# Patient Record
Sex: MALE | Race: WHITE | NOT HISPANIC OR LATINO | Employment: FULL TIME | URBAN - METROPOLITAN AREA
[De-identification: names, ages, dates, MRNs, and addresses within clinical notes are randomized per-mention and may not be internally consistent; named-entity substitution may affect disease eponyms.]

---

## 2018-02-07 ENCOUNTER — OFFICE VISIT CONVERTED (OUTPATIENT)
Dept: FAMILY MEDICINE CLINIC | Facility: CLINIC | Age: 58
End: 2018-02-07
Attending: FAMILY MEDICINE

## 2018-02-07 ENCOUNTER — CONVERSION ENCOUNTER (OUTPATIENT)
Dept: FAMILY MEDICINE CLINIC | Facility: CLINIC | Age: 58
End: 2018-02-07

## 2018-04-12 ENCOUNTER — OFFICE VISIT CONVERTED (OUTPATIENT)
Dept: FAMILY MEDICINE CLINIC | Facility: CLINIC | Age: 58
End: 2018-04-12
Attending: FAMILY MEDICINE

## 2018-04-12 ENCOUNTER — CONVERSION ENCOUNTER (OUTPATIENT)
Dept: FAMILY MEDICINE CLINIC | Facility: CLINIC | Age: 58
End: 2018-04-12

## 2018-07-10 ENCOUNTER — CONVERSION ENCOUNTER (OUTPATIENT)
Dept: FAMILY MEDICINE CLINIC | Facility: CLINIC | Age: 58
End: 2018-07-10

## 2018-07-10 ENCOUNTER — OFFICE VISIT CONVERTED (OUTPATIENT)
Dept: FAMILY MEDICINE CLINIC | Facility: CLINIC | Age: 58
End: 2018-07-10
Attending: FAMILY MEDICINE

## 2018-10-30 ENCOUNTER — CONVERSION ENCOUNTER (OUTPATIENT)
Dept: FAMILY MEDICINE CLINIC | Facility: CLINIC | Age: 58
End: 2018-10-30

## 2018-10-30 ENCOUNTER — OFFICE VISIT CONVERTED (OUTPATIENT)
Dept: FAMILY MEDICINE CLINIC | Facility: CLINIC | Age: 58
End: 2018-10-30
Attending: FAMILY MEDICINE

## 2019-02-20 ENCOUNTER — OFFICE VISIT CONVERTED (OUTPATIENT)
Dept: FAMILY MEDICINE CLINIC | Facility: CLINIC | Age: 59
End: 2019-02-20
Attending: FAMILY MEDICINE

## 2019-02-21 ENCOUNTER — CONVERSION ENCOUNTER (OUTPATIENT)
Dept: FAMILY MEDICINE CLINIC | Facility: CLINIC | Age: 59
End: 2019-02-21

## 2019-05-29 ENCOUNTER — HOSPITAL ENCOUNTER (OUTPATIENT)
Dept: OTHER | Facility: HOSPITAL | Age: 59
Discharge: HOME | End: 2019-05-29

## 2019-05-29 LAB
ALBUMIN SERPL-MCNC: 4.2 G/DL (ref 3.5–5)
ALBUMIN/GLOB SERPL: 1.4 {RATIO} (ref 1.4–2.6)
ALP SERPL-CCNC: 65 U/L (ref 56–155)
ALT SERPL-CCNC: 47 U/L (ref 10–40)
ANION GAP SERPL CALC-SCNC: 19 MMOL/L (ref 8–19)
AST SERPL-CCNC: 36 U/L (ref 15–50)
BASOPHILS # BLD AUTO: 0.09 10*3/UL (ref 0–0.2)
BASOPHILS NFR BLD AUTO: 1 % (ref 0–3)
BILIRUB SERPL-MCNC: 1.01 MG/DL (ref 0.2–1.3)
BUN SERPL-MCNC: 12 MG/DL (ref 5–25)
BUN/CREAT SERPL: 11 {RATIO} (ref 6–20)
CALCIUM SERPL-MCNC: 9.5 MG/DL (ref 8.7–10.4)
CHLORIDE SERPL-SCNC: 99 MMOL/L (ref 99–111)
CHOLEST SERPL-MCNC: 163 MG/DL (ref 107–200)
CHOLEST/HDLC SERPL: 5.8 {RATIO} (ref 3–6)
CONV ABS IMM GRAN: 0.05 10*3/UL (ref 0–0.2)
CONV CO2: 28 MMOL/L (ref 22–32)
CONV IMMATURE GRAN: 0.6 % (ref 0–1.8)
CONV TOTAL PROTEIN: 7.3 G/DL (ref 6.3–8.2)
CREAT UR-MCNC: 1.11 MG/DL (ref 0.7–1.2)
DEPRECATED RDW RBC AUTO: 44.9 FL (ref 35.1–43.9)
EOSINOPHIL # BLD AUTO: 0.21 10*3/UL (ref 0–0.7)
EOSINOPHIL # BLD AUTO: 2.4 % (ref 0–7)
ERYTHROCYTE [DISTWIDTH] IN BLOOD BY AUTOMATED COUNT: 13.4 % (ref 11.6–14.4)
EST. AVERAGE GLUCOSE BLD GHB EST-MCNC: 194 MG/DL
GFR SERPLBLD BASED ON 1.73 SQ M-ARVRAT: >60 ML/MIN/{1.73_M2}
GLOBULIN UR ELPH-MCNC: 3.1 G/DL (ref 2–3.5)
GLUCOSE SERPL-MCNC: 181 MG/DL (ref 70–99)
HBA1C MFR BLD: 15.3 G/DL (ref 14–18)
HBA1C MFR BLD: 8.4 % (ref 3.5–5.7)
HCT VFR BLD AUTO: 45.3 % (ref 42–52)
HDLC SERPL-MCNC: 28 MG/DL (ref 40–60)
LDLC SERPL CALC-MCNC: 85 MG/DL (ref 70–100)
LYMPHOCYTES # BLD AUTO: 2.52 10*3/UL (ref 1–5)
MCH RBC QN AUTO: 30.6 PG (ref 27–31)
MCHC RBC AUTO-ENTMCNC: 33.8 G/DL (ref 33–37)
MCV RBC AUTO: 90.6 FL (ref 80–96)
MONOCYTES # BLD AUTO: 0.6 10*3/UL (ref 0.2–1.2)
MONOCYTES NFR BLD AUTO: 7 % (ref 3–10)
NEUTROPHILS # BLD AUTO: 5.12 10*3/UL (ref 2–8)
NEUTROPHILS NFR BLD AUTO: 59.7 % (ref 30–85)
NRBC CBCN: 0 % (ref 0–0.7)
OSMOLALITY SERPL CALC.SUM OF ELEC: 298 MOSM/KG (ref 273–304)
PLATELET # BLD AUTO: 278 10*3/UL (ref 130–400)
PMV BLD AUTO: 10 FL (ref 9.4–12.4)
POTASSIUM SERPL-SCNC: 4.4 MMOL/L (ref 3.5–5.3)
RBC # BLD AUTO: 5 10*6/UL (ref 4.7–6.1)
SODIUM SERPL-SCNC: 142 MMOL/L (ref 135–147)
TRIGL SERPL-MCNC: 251 MG/DL (ref 40–150)
TSH SERPL-ACNC: 5.24 M[IU]/L (ref 0.27–4.2)
VARIANT LYMPHS NFR BLD MANUAL: 29.3 % (ref 20–45)
VLDLC SERPL-MCNC: 50 MG/DL (ref 5–37)
WBC # BLD AUTO: 8.59 10*3/UL (ref 4.8–10.8)

## 2019-06-05 ENCOUNTER — OFFICE VISIT CONVERTED (OUTPATIENT)
Dept: FAMILY MEDICINE CLINIC | Facility: CLINIC | Age: 59
End: 2019-06-05
Attending: FAMILY MEDICINE

## 2019-06-05 ENCOUNTER — CONVERSION ENCOUNTER (OUTPATIENT)
Dept: FAMILY MEDICINE CLINIC | Facility: CLINIC | Age: 59
End: 2019-06-05

## 2019-07-12 ENCOUNTER — HOSPITAL ENCOUNTER (OUTPATIENT)
Dept: OTHER | Facility: HOSPITAL | Age: 59
Discharge: HOME | End: 2019-07-12
Attending: FAMILY MEDICINE

## 2019-07-12 LAB — PSA SERPL-MCNC: 2.21 NG/ML (ref 0–4)

## 2019-07-18 ENCOUNTER — CONVERSION ENCOUNTER (OUTPATIENT)
Dept: SURGERY | Facility: CLINIC | Age: 59
End: 2019-07-18

## 2019-07-18 ENCOUNTER — OFFICE VISIT CONVERTED (OUTPATIENT)
Dept: SURGERY | Facility: CLINIC | Age: 59
End: 2019-07-18
Attending: NURSE PRACTITIONER

## 2020-03-11 ENCOUNTER — HOSPITAL ENCOUNTER (OUTPATIENT)
Dept: LAB | Facility: HOSPITAL | Age: 60
Discharge: HOME | End: 2020-03-11
Attending: FAMILY MEDICINE

## 2020-03-11 LAB
EST. AVERAGE GLUCOSE BLD GHB EST-MCNC: 134 MG/DL
HBA1C MFR BLD: 6.3 % (ref 3.5–5.7)

## 2020-03-16 ENCOUNTER — OFFICE VISIT CONVERTED (OUTPATIENT)
Dept: FAMILY MEDICINE CLINIC | Facility: CLINIC | Age: 60
End: 2020-03-16
Attending: FAMILY MEDICINE

## 2020-05-29 ENCOUNTER — HOSPITAL ENCOUNTER (OUTPATIENT)
Dept: OTHER | Facility: HOSPITAL | Age: 60
Discharge: HOME | End: 2020-05-29

## 2020-05-29 LAB
ALBUMIN SERPL-MCNC: 4.4 G/DL (ref 3.5–5)
ALBUMIN/GLOB SERPL: 1.3 {RATIO} (ref 1.4–2.6)
ALP SERPL-CCNC: 60 U/L (ref 56–155)
ALT SERPL-CCNC: 29 U/L (ref 10–40)
ANION GAP SERPL CALC-SCNC: 17 MMOL/L (ref 8–19)
AST SERPL-CCNC: 19 U/L (ref 15–50)
BASOPHILS # BLD AUTO: 0.07 10*3/UL (ref 0–0.2)
BASOPHILS NFR BLD AUTO: 0.9 % (ref 0–3)
BILIRUB SERPL-MCNC: 1.27 MG/DL (ref 0.2–1.3)
BUN SERPL-MCNC: 16 MG/DL (ref 5–25)
BUN/CREAT SERPL: 13 {RATIO} (ref 6–20)
CALCIUM SERPL-MCNC: 9.5 MG/DL (ref 8.7–10.4)
CHLORIDE SERPL-SCNC: 102 MMOL/L (ref 99–111)
CHOLEST SERPL-MCNC: 150 MG/DL (ref 107–200)
CHOLEST/HDLC SERPL: 4.4 {RATIO} (ref 3–6)
CONV ABS IMM GRAN: 0.04 10*3/UL (ref 0–0.2)
CONV CO2: 25 MMOL/L (ref 22–32)
CONV IMMATURE GRAN: 0.5 % (ref 0–1.8)
CONV TOTAL PROTEIN: 7.7 G/DL (ref 6.3–8.2)
CREAT UR-MCNC: 1.19 MG/DL (ref 0.7–1.2)
DEPRECATED RDW RBC AUTO: 44 FL (ref 35.1–43.9)
EOSINOPHIL # BLD AUTO: 0.16 10*3/UL (ref 0–0.7)
EOSINOPHIL # BLD AUTO: 2 % (ref 0–7)
ERYTHROCYTE [DISTWIDTH] IN BLOOD BY AUTOMATED COUNT: 13.5 % (ref 11.6–14.4)
EST. AVERAGE GLUCOSE BLD GHB EST-MCNC: 160 MG/DL
GFR SERPLBLD BASED ON 1.73 SQ M-ARVRAT: >60 ML/MIN/{1.73_M2}
GLOBULIN UR ELPH-MCNC: 3.3 G/DL (ref 2–3.5)
GLUCOSE SERPL-MCNC: 121 MG/DL (ref 70–99)
HBA1C MFR BLD: 7.2 % (ref 3.5–5.7)
HCT VFR BLD AUTO: 48.7 % (ref 42–52)
HDLC SERPL-MCNC: 34 MG/DL (ref 40–60)
HGB BLD-MCNC: 16.5 G/DL (ref 14–18)
LDLC SERPL CALC-MCNC: 91 MG/DL (ref 70–100)
LYMPHOCYTES # BLD AUTO: 2.22 10*3/UL (ref 1–5)
LYMPHOCYTES NFR BLD AUTO: 28 % (ref 20–45)
MCH RBC QN AUTO: 30.2 PG (ref 27–31)
MCHC RBC AUTO-ENTMCNC: 33.9 G/DL (ref 33–37)
MCV RBC AUTO: 89 FL (ref 80–96)
MONOCYTES # BLD AUTO: 0.66 10*3/UL (ref 0.2–1.2)
MONOCYTES NFR BLD AUTO: 8.3 % (ref 3–10)
NEUTROPHILS # BLD AUTO: 4.79 10*3/UL (ref 2–8)
NEUTROPHILS NFR BLD AUTO: 60.3 % (ref 30–85)
NRBC CBCN: 0 % (ref 0–0.7)
OSMOLALITY SERPL CALC.SUM OF ELEC: 292 MOSM/KG (ref 273–304)
PLATELET # BLD AUTO: 287 10*3/UL (ref 130–400)
PMV BLD AUTO: 9.9 FL (ref 9.4–12.4)
POTASSIUM SERPL-SCNC: 4.1 MMOL/L (ref 3.5–5.3)
RBC # BLD AUTO: 5.47 10*6/UL (ref 4.7–6.1)
SODIUM SERPL-SCNC: 140 MMOL/L (ref 135–147)
TRIGL SERPL-MCNC: 124 MG/DL (ref 40–150)
TSH SERPL-ACNC: 3.71 M[IU]/L (ref 0.27–4.2)
VLDLC SERPL-MCNC: 25 MG/DL (ref 5–37)
WBC # BLD AUTO: 7.94 10*3/UL (ref 4.8–10.8)

## 2020-07-15 ENCOUNTER — OFFICE VISIT CONVERTED (OUTPATIENT)
Dept: FAMILY MEDICINE CLINIC | Facility: CLINIC | Age: 60
End: 2020-07-15
Attending: FAMILY MEDICINE

## 2020-07-15 ENCOUNTER — HOSPITAL ENCOUNTER (OUTPATIENT)
Dept: FAMILY MEDICINE CLINIC | Facility: CLINIC | Age: 60
Discharge: HOME | End: 2020-07-15
Attending: FAMILY MEDICINE

## 2020-07-16 ENCOUNTER — CONVERSION ENCOUNTER (OUTPATIENT)
Dept: FAMILY MEDICINE CLINIC | Facility: CLINIC | Age: 60
End: 2020-07-16

## 2020-09-09 ENCOUNTER — CONVERSION ENCOUNTER (OUTPATIENT)
Dept: GASTROENTEROLOGY | Facility: CLINIC | Age: 60
End: 2020-09-09
Attending: INTERNAL MEDICINE

## 2020-11-02 ENCOUNTER — HOSPITAL ENCOUNTER (OUTPATIENT)
Dept: PREADMISSION TESTING | Facility: HOSPITAL | Age: 60
Discharge: HOME | End: 2020-11-02
Attending: INTERNAL MEDICINE

## 2020-11-04 LAB — SARS-COV-2 RNA SPEC QL NAA+PROBE: NOT DETECTED

## 2020-11-06 ENCOUNTER — HOSPITAL ENCOUNTER (OUTPATIENT)
Dept: GASTROENTEROLOGY | Facility: HOSPITAL | Age: 60
Setting detail: HOSPITAL OUTPATIENT SURGERY
Discharge: HOME | End: 2020-11-06
Attending: INTERNAL MEDICINE

## 2020-11-06 LAB — GLUCOSE BLD-MCNC: 102 MG/DL (ref 70–99)

## 2020-12-28 ENCOUNTER — HOSPITAL ENCOUNTER (OUTPATIENT)
Dept: OTHER | Facility: HOSPITAL | Age: 60
Discharge: HOME | End: 2020-12-28
Attending: INTERNAL MEDICINE

## 2021-01-21 ENCOUNTER — HOSPITAL ENCOUNTER (OUTPATIENT)
Dept: OTHER | Facility: HOSPITAL | Age: 61
Discharge: HOME | End: 2021-01-21
Attending: INTERNAL MEDICINE

## 2021-05-04 ENCOUNTER — HOSPITAL ENCOUNTER (OUTPATIENT)
Dept: OTHER | Facility: HOSPITAL | Age: 61
Discharge: HOME | End: 2021-05-04
Attending: FAMILY MEDICINE

## 2021-05-04 LAB
EST. AVERAGE GLUCOSE BLD GHB EST-MCNC: 126 MG/DL
HBA1C MFR BLD: 6 % (ref 3.5–5.7)
PSA SERPL-MCNC: 1.07 NG/ML (ref 0–4)

## 2021-05-05 ENCOUNTER — HOSPITAL ENCOUNTER (OUTPATIENT)
Dept: OTHER | Facility: HOSPITAL | Age: 61
Discharge: HOME | End: 2021-05-05
Attending: FAMILY MEDICINE

## 2021-05-05 ENCOUNTER — HOSPITAL ENCOUNTER (OUTPATIENT)
Dept: OTHER | Facility: HOSPITAL | Age: 61
Discharge: HOME | End: 2021-05-05

## 2021-05-05 LAB
ALBUMIN SERPL-MCNC: 4.5 G/DL (ref 3.5–5)
ALBUMIN/GLOB SERPL: 1.5 {RATIO} (ref 1.4–2.6)
ALP SERPL-CCNC: 60 U/L (ref 56–155)
ALT SERPL-CCNC: 26 U/L (ref 10–40)
ANION GAP SERPL CALC-SCNC: 17 MMOL/L (ref 8–19)
AST SERPL-CCNC: 27 U/L (ref 15–50)
BASOPHILS # BLD AUTO: 0.1 10*3/UL (ref 0–0.2)
BASOPHILS NFR BLD AUTO: 1.1 % (ref 0–3)
BILIRUB SERPL-MCNC: 1.14 MG/DL (ref 0.2–1.3)
BUN SERPL-MCNC: 12 MG/DL (ref 5–25)
BUN/CREAT SERPL: 12 {RATIO} (ref 6–20)
CALCIUM SERPL-MCNC: 9.3 MG/DL (ref 8.7–10.4)
CHLORIDE SERPL-SCNC: 104 MMOL/L (ref 99–111)
CHOLEST SERPL-MCNC: 139 MG/DL (ref 107–200)
CHOLEST/HDLC SERPL: 4.1 {RATIO} (ref 3–6)
CONV ABS IMM GRAN: 0.03 10*3/UL (ref 0–0.2)
CONV CO2: 24 MMOL/L (ref 22–32)
CONV IMMATURE GRAN: 0.3 % (ref 0–1.8)
CONV TOTAL PROTEIN: 7.6 G/DL (ref 6.3–8.2)
CREAT UR-MCNC: 1.03 MG/DL (ref 0.7–1.2)
DEPRECATED RDW RBC AUTO: 47 FL (ref 35.1–43.9)
EOSINOPHIL # BLD AUTO: 0.13 10*3/UL (ref 0–0.7)
EOSINOPHIL # BLD AUTO: 1.5 % (ref 0–7)
ERYTHROCYTE [DISTWIDTH] IN BLOOD BY AUTOMATED COUNT: 14 % (ref 11.6–14.4)
GFR SERPLBLD BASED ON 1.73 SQ M-ARVRAT: >60 ML/MIN/{1.73_M2}
GLOBULIN UR ELPH-MCNC: 3.1 G/DL (ref 2–3.5)
GLUCOSE SERPL-MCNC: 92 MG/DL (ref 70–99)
HCT VFR BLD AUTO: 51.3 % (ref 42–52)
HDLC SERPL-MCNC: 34 MG/DL (ref 40–60)
HGB BLD-MCNC: 16.9 G/DL (ref 14–18)
LDLC SERPL CALC-MCNC: 80 MG/DL (ref 70–100)
LYMPHOCYTES # BLD AUTO: 2.58 10*3/UL (ref 1–5)
LYMPHOCYTES NFR BLD AUTO: 29.1 % (ref 20–45)
MCH RBC QN AUTO: 30.1 PG (ref 27–31)
MCHC RBC AUTO-ENTMCNC: 32.9 G/DL (ref 33–37)
MCV RBC AUTO: 91.4 FL (ref 80–96)
MONOCYTES # BLD AUTO: 0.61 10*3/UL (ref 0.2–1.2)
MONOCYTES NFR BLD AUTO: 6.9 % (ref 3–10)
NEUTROPHILS # BLD AUTO: 5.43 10*3/UL (ref 2–8)
NEUTROPHILS NFR BLD AUTO: 61.1 % (ref 30–85)
NRBC CBCN: 0 % (ref 0–0.7)
OSMOLALITY SERPL CALC.SUM OF ELEC: 289 MOSM/KG (ref 273–304)
PLATELET # BLD AUTO: 313 10*3/UL (ref 130–400)
PMV BLD AUTO: 10.5 FL (ref 9.4–12.4)
POTASSIUM SERPL-SCNC: 4.5 MMOL/L (ref 3.5–5.3)
RBC # BLD AUTO: 5.61 10*6/UL (ref 4.7–6.1)
SODIUM SERPL-SCNC: 140 MMOL/L (ref 135–147)
TRIGL SERPL-MCNC: 127 MG/DL (ref 40–150)
TSH SERPL-ACNC: 1.97 M[IU]/L (ref 0.27–4.2)
VLDLC SERPL-MCNC: 25 MG/DL (ref 5–37)
WBC # BLD AUTO: 8.88 10*3/UL (ref 4.8–10.8)

## 2021-05-10 NOTE — H&P
History and Physical      Patient Name: Bruno Morton   Patient ID: [id number]   Sex: Male   YOB: 1960        Create Date: September 9, 2020              Chief Complaint  · Surgical History and Physical  · Screening Colonoscopy      History Of Present Illness  NON-INPATIENT HISTORY AND PHYSICAL  Allergies: NO KNOWN DRUG ALLERGIES   Chief Complaint/History of Present Illness: Colonoscopy Screening, personal history of colonic polyps   Colon Recall: No   Failed Outpatient Treatment/Contraindications: N/A   Current Medications: atorvastatin 20 mg oral tablet, Blood Glucose Test miscellaneous strip, blood-glucose meter miscellaneous kit, Diovan -12.5 mg oral tablet, hydrocortisone 2.5 % topical ointment, Jardiance 10 mg oral tablet, Lancets,Ultra Thin 26 gauge miscellaneous misc, levothyroxine 50 mcg oral tablet, metformin 500 mg oral tablet extended release 24 hr, Miralax 17 gram/dose oral powder, omeprazole 40 mg oral capsule,delayed release(DR/EC), and Suprep Bowel Prep Kit 17.5-3.13-1.6 gram oral recon soln   Significant Past Medical History: Allergic rhinitis, Colitis, Ulcerative, Diabetes mellitus, type 2, Diabetes type 2, uncontrolled, Eczematous dermatitis, Hyperlipidemia, Hypertension, essential, Hypothyroid, and Medication management   Significant Family Medical History: No family history of Colon Cancer   Significant Past Surgical History: Colonoscopy and EGD   Previous Colonoscopy: Yes YEAR: 2014 By Whom: Dean Gonsalves MD   Previous EGD: Yes YEAR: 2014 By Whom: Dean Gonsalves MD   PHYSICAL EXAM:  Heart: Regular Rate and Rhythm   Lungs: Breathing Unlabored           Assessment  · Preoperative examination     V72.84/Z01.818  · Screening for colon cancer     V76.51/Z12.11  · Personal history of colonic polyps     V12.72/Z86.010      Plan  · Orders  o Consent for Colonoscopy Screening -Possible risk/complications, benefits, and alternatives to surgical or invasive procedure have been explained to  patient and/or legal gaurdian. -Patient has been evaluated and can tolerate anethesia and/or sedation. Risk, benefits, and alternatives to anesthesia and sedation have been explained to patient or legal gaurdian. () - V72.84/Z01.818, V76.51/Z12.11, V12.72/Z86.010 - 11/06/2020  · Instructions  o ****Surgical Orders****  o ***************  o Outpatient  o ***************  o RISK AND BENEFITS:  o Possible risks/complications, benefits and alternatives to surgical or invasive procedure have been explained to the patient and/or legal guardian.  o Patient has been evaluated and can tolerate anesthesia and/or sedation. Risks, benefits, and alternatives to anesthesia and sedation have been explained to the patient and/or legal guardian.  o ***************  o PREP: Per protocol  o IV: Per Anesthesia  o The above History and Physical Examination has been completed within 30 days of admission.  o This note has been transcribed by BARBARA Malone. I have read and agree with the findings in this note.            Electronically Signed by: Shirley Cintron MA -Author on September 9, 2020 09:47:49 AM

## 2021-05-12 ENCOUNTER — OFFICE VISIT CONVERTED (OUTPATIENT)
Dept: FAMILY MEDICINE CLINIC | Facility: CLINIC | Age: 61
End: 2021-05-12
Attending: FAMILY MEDICINE

## 2021-05-12 ENCOUNTER — CONVERSION ENCOUNTER (OUTPATIENT)
Dept: FAMILY MEDICINE CLINIC | Facility: CLINIC | Age: 61
End: 2021-05-12

## 2021-05-13 NOTE — PROGRESS NOTES
Progress Note      Patient Name: Bruno Morton   Patient ID: [id number]   Sex: Male   YOB: 1960    Primary Care Provider: Alejo Wood III MD   Referring Provider: Alejo Wood III MD    Visit Date: July 15, 2020    Provider: Alejo Wood III MD   Location: [facility]   Location Address: [address] [place] [address]  [id number]   Location Phone: [phone]          Chief Complaint  · removal of lesion leg      History Of Present Illness  Bruno Morton is a 60 year old /White male who presents for evaluation and treatment of:      HPI    patient 60-year-old type 2 diabetes hypertension hypothyroidism had a red macule about 2 cm x 2 cm on his left shin for a year.  Has not changed much recently.  Also has a macule on his right upper arm that is been there for indeterminate amount of time.    Review of systems     no other problems cardiovascular discussed exercise with the patient at length.  Is not having particular problems exercising just is only doing it at work but discussed that using stairs.    Physical exam temperature     98.9 blood pressure 120/70 weight 190  General no distress  Skin macules to the left shin about 2 x 2 and the other about 1 cm.  Punch the and a erythematous.  The right forearm has a macule that appears to be an actinic keratosis will shave that also.     In office procedure after careful chlorhexidine prep patient had a punch biopsy done of the macule on the left shin was sent for evaluation.  And pathology dermatopathology.  Was closed with 1 suture 4-0 nylon.  Also patient had a shave removal 1 cm macule on his right upper arm.  It was also sent for pathology to the hospital.    Assessment    punch biopsy irritated macule erythematous left shin 1 suture 4-0 nylon   #2 is shave removal right upper arm sent for pathology.    Plan     Vaseline and soap and water daily remove sutures in the left shin in 2 weeks       Past Medical  History  Disease Name Date Onset Notes   Allergic rhinitis --  --    Colitis, Ulcerative 1980 --    Diabetes mellitus, type 2 02/21/2017 --    Diabetes type 2, uncontrolled 02/21/2019 --    Eczematous dermatitis 03/16/2020 --    Hyperlipidemia --  --    Hypertension, essential --  --    Hypothyroid 02/21/2019 --    Medication management 02/06/2017 --          Past Surgical History  Procedure Name Date Notes   Colonoscopy --  --          Medication List  Name Date Started Instructions   atorvastatin 20 mg oral tablet 03/25/2020 take 1 tablet (20 mg) by oral route once daily for 90 days   Blood Glucose Test miscellaneous strip 03/25/2020 check sugar once daily   blood-glucose meter miscellaneous kit  use as directed testing 3 times weekly   Diovan -12.5 mg oral tablet 03/25/2020 take 1 tablet by oral route once daily for 90 days   hydrocortisone 2.5 % topical ointment 07/15/2020 apply a thin layer to the affected area(s) by topical route 2 times per day D: 454 gram jar   Jardiance 10 mg oral tablet 03/25/2020 TAKE 1 TABLET DAILY IN THE MORNING   Lancets,Ultra Thin 26 gauge miscellaneous misc  use as directed testing 3 times weekly   levothyroxine 50 mcg oral tablet 03/25/2020 take 1 tablet (50 mcg) by oral route once daily for 90 days   metformin 500 mg oral tablet extended release 24 hr 03/25/2020 take 4 tablets (2,000 mg) by oral route once daily with the evening meal for 90 days   Miralax 17 gram/dose oral powder  take 17 gram mixed with 8 oz. water, juice, soda, coffee or tea by oral route once daily   omeprazole 40 mg oral capsule,delayed release(DR/EC) 03/16/2020 take 1 capsule (40 mg) by oral route once daily before a meal for 30 days   ProAir HFA 90 mcg/actuation inhalation HFA aerosol inhaler 01/27/2016 inhale 1 - 2 puffs by inhalation route every 6 hours as needed for 90 days         Allergy List  Allergen Name Date Reaction Notes   NO KNOWN DRUG ALLERGIES --  --  --          Family Medical  History  Disease Name Relative/Age Notes   Heart Disease Father/   --          Social History  Finding Status Start/Stop Quantity Notes   Active but no formal exercise --  --/-- --  --    Alcohol Never --/-- --  06/28/2017 -    Life Partner --  --/-- --  --    Tobacco Never --/-- --  --          Immunizations  NameDate Admin Mfg Trade Name Lot Number Route Inj VIS Given VIS Publication   Hepatitis A06/05/2019 MSD VAQTA-ADULT [id number] [facility] 06/05/2019    Comments: NDC [id number]   Hepatitis A10/30/2018 SKB HAVRIX-ADULT 37ry4 [facility] 10/30/2018 10/25/2011   Comments: ndc [id number]   Bxjxgcroc93/14/2019 NE Fluarix, quadrivalent, preservative free  NE NE     Comments:    Oscccnbjs75/04/2014 SKB Fluarix, quadrivalent, preservative free [id number] [facility] 11/04/2014 07/02/2012   Comments:    Vqkbceiep8529/21/2017 K PNEUMOVAX 23 [id number] [facility] 02/21/2017 04/24/2015   Comments: pt tolerated shot well         Review of Systems  · Constitutional  o * See HPI  · Eyes  o * See HPI  · HENT  o * See HPI  · Breasts  o * See HPI  · Cardiovascular  o * See HPI  · Respiratory  o * See HPI  · Gastrointestinal  o * See HPI  · Genitourinary  o * See HPI  · Integument  o * See HPI  · Neurologic  o * See HPI  · Musculoskeletal  o * See HPI  · Endocrine  o * See HPI  · Psychiatric  o * See HPI  · Heme-Lymph  o * See HPI  · Allergic-Immunologic  o * See HPI      Vitals  Date Time BP Position Site L\R Cuff Size HR RR TEMP (F) WT  HT  BMI kg/m2 BSA m2 O2 Sat HC       07/16/2020 08:17 /70 Sitting      98.9 195lbs 16oz                  Assessment  · Eczematous dermatitis     692.9/L30.9  · Actinic keratoses     702.0/L57.0  · Scaling of skin     782.8/R23.4  · Skin macule     709.8/L98.8    Problems Reconciled  Plan  · Orders  o ACO-39: Current medications updated and reviewed () - - 07/15/2020  o Punch Biopsy of skin:single lesion (45774) - 782.8/R23.4, 709.8/L98.8 - 07/15/2020   2cm red macule on left shin. red macule x 1 year,   o Shave  biopsy of single skin lesion, trunk, arms or legs; lesion diameter 0.6 to 1.0 cm (19483) - 702.0/L57.0, 782.8/R23.4, 709.8/L98.8 - 07/15/2020   red scaly macule on right upper arm   · Medications  o hydrocortisone 2.5 % topical ointment   SIG: apply a thin layer to the affected area(s) by topical route 2 times per day D: 454 gram jar   DISP: (1) 454 gm jar with 1 refills  Refilled on 07/15/2020     o Medications have been Reconciled  o Transition of Care or Provider Policy  · Instructions  o Patient was educated/instructed on their diagnosis, treatment and medications prior to discharge from the clinic today.  o Bring all medicines with their bottles to each office visit.  o Time spent with the patient was 40 minutes, more than 50% face to face.  o Chronic conditions reviewed and taken into consideration for today's treatment plan.  o Discussed Covid-19 precautions including, but not limited to, social distancing, avoid touching your face, and hand washing.             Electronically Signed by: Vivian Wallace, -Author on July 16, 2020 08:25:27 PM  Electronically Co-signed by: Alejo Wood III MD -Reviewer on July 20, 2020 02:14:57 PM

## 2021-05-15 VITALS — TEMPERATURE: 98.9 F | DIASTOLIC BLOOD PRESSURE: 70 MMHG | WEIGHT: 196 LBS | SYSTOLIC BLOOD PRESSURE: 120 MMHG

## 2021-05-15 VITALS — BODY MASS INDEX: 32.22 KG/M2 | WEIGHT: 200.5 LBS | HEIGHT: 66 IN | RESPIRATION RATE: 14 BRPM

## 2021-05-15 VITALS
BODY MASS INDEX: 32.65 KG/M2 | DIASTOLIC BLOOD PRESSURE: 70 MMHG | SYSTOLIC BLOOD PRESSURE: 130 MMHG | WEIGHT: 208 LBS | HEIGHT: 67 IN

## 2021-05-15 VITALS
DIASTOLIC BLOOD PRESSURE: 70 MMHG | HEIGHT: 67 IN | BODY MASS INDEX: 30.13 KG/M2 | SYSTOLIC BLOOD PRESSURE: 110 MMHG | WEIGHT: 192 LBS

## 2021-05-16 VITALS
WEIGHT: 212 LBS | DIASTOLIC BLOOD PRESSURE: 72 MMHG | SYSTOLIC BLOOD PRESSURE: 132 MMHG | BODY MASS INDEX: 33.27 KG/M2 | HEIGHT: 67 IN

## 2021-05-16 VITALS
SYSTOLIC BLOOD PRESSURE: 130 MMHG | WEIGHT: 209 LBS | HEIGHT: 67 IN | BODY MASS INDEX: 32.8 KG/M2 | DIASTOLIC BLOOD PRESSURE: 70 MMHG

## 2021-05-16 VITALS
SYSTOLIC BLOOD PRESSURE: 120 MMHG | HEIGHT: 67 IN | BODY MASS INDEX: 33.9 KG/M2 | WEIGHT: 216 LBS | DIASTOLIC BLOOD PRESSURE: 80 MMHG

## 2021-05-16 VITALS
DIASTOLIC BLOOD PRESSURE: 72 MMHG | WEIGHT: 208 LBS | BODY MASS INDEX: 32.65 KG/M2 | SYSTOLIC BLOOD PRESSURE: 120 MMHG | HEIGHT: 67 IN

## 2021-05-16 VITALS
BODY MASS INDEX: 32.65 KG/M2 | DIASTOLIC BLOOD PRESSURE: 70 MMHG | SYSTOLIC BLOOD PRESSURE: 132 MMHG | HEIGHT: 67 IN | TEMPERATURE: 99 F | WEIGHT: 208 LBS

## 2021-06-05 NOTE — PROGRESS NOTES
Progress Note      Patient Name: Bruno Morton   Patient ID: [id number]   Sex: Male   YOB: 1960    Primary Care Provider: Alejo Wood III MD   Referring Provider: Alejo Wood III MD    Visit Date: May 12, 2021    Provider: Alejo Wood III MD   Location: [facility]   Location Address: [address] [place] [address]  [id number]   Location Phone: [phone]          Chief Complaint  · Adult General Male Physical Exam      History Of Present Illness  Bruno Morton is a 61 year old /White male who presents for evaluation and treatment of: yearly wellness exam.      HPI     patient is 61 years old here for yearly exam.   history of type 2 diabetes, hypertension, hypothyroidism.    History of some colitis in the past.  Tubular adenoma in 2014 colonoscopy in 2020 and needs another one in 2025 there is no polyps in 2020.    Review of systems     cardiovascular no chest pain no palpitations    respiratory no shortness of breath no dyspnea  GI tubular adenoma 2014 normal colonoscopy in 2020 needs another one in 12/2025  endocrine hemoglobin A1c was today was 6.0 done on 5 4  Skin abraded area at the top of the cleft of the buttocks.  Sits on a area that does not breathe well.  Patient needs to use boxer shorts and sit in area that gets more ventilation.  Wellness moisture should use some antifungal powder like OTC antifungal powder    Physical exam     pulse ox 97 heart rate 85 blood pressure 104/64 temperature 97.2 weight is 184 this is a 12 pound weight loss  General no distress  HEENT bilateral cerumen impactions patient is a.  Pharynx is normal clear.  Neck no adenopathy no thyroidomegaly  Skin back is dry top of the cleft of the buttocks has a abraded area about 2 cm in length and about 0.2 cm wide.  Is due to moisture to be dried out.    rectal 1+ prostate hypertrophy no nodules no asymmetry  Cardiovascular regular rhythm no murmur  Respiratory  lungs clear and equal bilaterally no rales no no rhonchi no wheezes aortic pulsation bit prominent ultrasound of the abdominal aorta is 1.4 cm  Musculoskeletal knees show no crepitations hips show no internal or external Tatian problems.  Shoulders are negative  Genitalia testicles are normal penis is normal no hernias    Assessment     type 2 diabetes good control A1c is 6.0   #2 is hypercholesterolemia under good control LDL is 80 HDL is 34   #3 hypothyroidism controlled TSH is 1.9   next is hypertension under good control blood pressure 104/64    Plan     is to moisturize the skin of his background to dry out the skin at the top of the gluteal cleft   recheck in 6 months.       Past Medical History  Disease Name Date Onset Notes   Allergic rhinitis --  --    Colitis, Ulcerative 1980 --    Diabetes mellitus, type 2 02/21/2017 --    Diabetes type 2, uncontrolled 02/21/2019 --    Eczematous dermatitis 03/16/2020 --    Hyperlipidemia --  --    Hypertension, essential --  --    Hypothyroid 02/21/2019 --    Medication management 02/06/2017 --          Past Surgical History  Procedure Name Date Notes   Colonoscopy 2014 2020 --    EGD 2014 --          Medication List  Name Date Started Instructions   atorvastatin 20 mg oral tablet 01/27/2021 take 1 tablet (20 mg) by oral route once daily for 90 days   Blood Glucose Test miscellaneous strip 01/27/2021 check sugar once daily   blood-glucose meter miscellaneous kit  use as directed testing 3 times weekly   Diovan -12.5 mg oral tablet 01/27/2021 take 1 tablet by oral route once daily for 90 days   hydrocortisone 2.5 % topical ointment 07/15/2020 apply a thin layer to the affected area(s) by topical route 2 times per day D: 454 gram jar   Jardiance 10 mg oral tablet 01/27/2021 TAKE 1 TABLET EVERY MORNING   Lancets,Ultra Thin 26 gauge miscellaneous misc 01/27/2021 use as directed testing 3 times weekly   levothyroxine 50 mcg oral tablet 01/27/2021 take 1 tablet (50 mcg)  by oral route once daily for 90 days   metformin 500 mg oral tablet extended release 24 hr 01/27/2021 TAKE 4 TABLETS(2,000 MG) ONCE DAILY WITH THE EVENINGMEAL   Miralax 17 gram/dose oral powder  take 17 gram mixed with 8 oz. water, juice, soda, coffee or tea by oral route once daily   omeprazole 40 mg oral capsule,delayed release(/EC) 01/27/2021 take 1 capsule (40 mg) by oral route once daily before a meal for 90 days   Suprep Bowel Prep Kit 17.5-3.13-1.6 gram oral recon soln 09/09/2020 take as directed for 1 day         Allergy List  Allergen Name Date Reaction Notes   NO KNOWN DRUG ALLERGIES --  --  --        Allergies Reconciled  Family Medical History  Disease Name Relative/Age Notes   Heart Disease Father/   --    - No Family History of Colorectal Cancer  --          Social History  Finding Status Start/Stop Quantity Notes   Active but no formal exercise --  --/-- --  --    Alcohol Never --/-- --  06/28/2017 -    Life Partner --  --/-- --  --    Tobacco Never --/-- --  --          Immunizations  NameDate Admin Mfg Trade Name Lot Number Route Inj VIS Given VIS Publication   COVID Pjntwzp6001/21/2021 MOD Moderna COVID-19 Vaccine  NE NE 05/12/2021    Comments:    COVID Oodqzja39/28/2020 MOD Moderna COVID-19 Vaccine  NE NE 05/12/2021    Comments:    Hepatitis A06/05/2019 MSD VAQTA-ADULT [id number]  [place] 06/05/2019    Comments: NDC [id number]   Hepatitis A10/30/2018 SKB HAVRIX-ADULT 37ry4 [facility] 10/30/2018 10/25/2011   Comments: ndc [id number]   Tqeosnonm08/14/2019 NE Fluarix, quadrivalent, preservative free  NE NE     Comments:    Gmkunpqeh6518/21/2017 UNK PNEUMOVAX 23 [id number] IM [facility] 02/21/2017 04/24/2015   Comments: pt tolerated shot well         Review of Systems  · Constitutional  o * See HPI  · Eyes  o * See HPI  · HENT  o * See HPI  · Breasts  o * See HPI  · Cardiovascular  o * See HPI  · Respiratory  o * See HPI  · Gastrointestinal  o * See HPI  · Genitourinary  o * See HPI  · Integument  o * See  "HPI  · Neurologic  o * See HPI  · Musculoskeletal  o * See HPI  · Endocrine  o * See HPI  · Psychiatric  o * See HPI  · Heme-Lymph  o * See HPI  · Allergic-Immunologic  o * See HPI      Vitals  Date Time BP Position Site L\R Cuff Size HR RR TEMP (F) WT  HT  BMI kg/m2 BSA m2 O2 Sat FR L/min FiO2 HC       05/12/2021 03:11 /68 Sitting    85 - R  97.2 184lbs 0oz 5'  7\" 28.82 1.99 97 %  21%                  Assessment  · Screening for depression     V79.0/Z13.89  · Screening for AAA (abdominal aortic aneurysm)     V81.2/Z13.6  05/12/2021 1.4cm    · General Medical Exam, Adult (CPE)     V70.0/Z00.00  · Diabetes mellitus, type 2     250.00/E11.9  · Diabetes type 2, uncontrolled     250.02/E11.65  · Eczematous dermatitis     692.9/L30.9  · Hypothyroid     244.9/E03.9  · Medication management     V58.69/Z79.899  · Allergic rhinitis     477.9/J30.9  · Hypertension, essential     401.9/I10    Problems Reconciled  Plan  · Orders  o Diabetic Foot (Motor and Sensory) Exam Completed [facility] (, , 2028F) - 250.00/E11.9 - 05/12/2021  o ACO-18: Negative screen for clinical depression using a standardized tool () - - 05/12/2021  o ACO-19: Colorectal cancer screening results documented and reviewed (3017F) - - 05/12/2021  o ACO-39: Current medications updated and reviewed (, 1159F) - - 05/12/2021  o AAA Screening. (47555, ) - V81.2/Z13.6 - 05/12/2021   1.4 cm measured in the office by ultrasound  · Medications  o atorvastatin 20 mg oral tablet   SIG: take 1 tablet (20 mg) by oral route once daily for 90 days   DISP: (90) Tablet with 3 refills  Refilled on 05/12/2021     o Diovan -12.5 mg oral tablet   SIG: take 1 tablet by oral route once daily for 90 days   DISP: (90) Tablet with 3 refills  Refilled on 05/12/2021     o Jardiance 10 mg oral tablet   SIG: TAKE 1 TABLET EVERY MORNING   DISP: (90) Tablet with 3 refills  Refilled on 05/12/2021     o levothyroxine 50 mcg oral tablet   SIG: take 1 tablet (50 " mcg) by oral route once daily for 90 days   DISP: (90) Tablet with 3 refills  Refilled on 05/12/2021     o metformin 500 mg oral tablet extended release 24 hr   SIG: TAKE 4 TABLETS(2,000 MG) ONCE DAILY WITH THE EVENINGMEAL   DISP: (360) Tablet with 3 refills  Refilled on 05/12/2021     o omeprazole 40 mg oral capsule,delayed release(DR/EC)   SIG: take 1 capsule (40 mg) by oral route once daily before a meal for 90 days   DISP: (90) Capsule with 3 refills  Refilled on 05/12/2021     o Medications have been Reconciled  o Transition of Care or Provider Policy  · Instructions  o Depression Screen completed and scanned into the EMR under the designated folder within the patient's documents.  o Today's PHQ-9 result is _0__  o The provider screening met the required time of 15 minutes.  o Patient is taking medications as prescribed and doing well.   o Take all medications as prescribed/directed.  o Patient instructed/educated on their diet and exercise program.  o Patient was educated/instructed on their diagnosis, treatment and medications prior to discharge from the clinic today.  o Bring all medicines with their bottles to each office visit.  o Time spent with the patient was 30 minutes, more than 50% face to face.  o Chronic conditions reviewed and taken into consideration for today's treatment plan.  o Discussed Covid-19 precautions including, but not limited to, social distancing, avoid touching your face, and hand washing.             Electronically Signed by: Vivian Wallace, -Author on May 13, 2021 05:57:53 PM  Electronically Co-signed by: Alejo Wood III MD -Reviewer on May 17, 2021 01:43:11 PM

## 2021-07-15 VITALS
DIASTOLIC BLOOD PRESSURE: 68 MMHG | TEMPERATURE: 97.2 F | SYSTOLIC BLOOD PRESSURE: 104 MMHG | BODY MASS INDEX: 28.88 KG/M2 | HEIGHT: 67 IN | WEIGHT: 184 LBS | OXYGEN SATURATION: 97 % | HEART RATE: 85 BPM

## 2021-11-15 ENCOUNTER — OFFICE VISIT (OUTPATIENT)
Dept: FAMILY MEDICINE CLINIC | Facility: CLINIC | Age: 61
End: 2021-11-15

## 2021-11-15 VITALS
WEIGHT: 182 LBS | BODY MASS INDEX: 28.56 KG/M2 | OXYGEN SATURATION: 98 % | DIASTOLIC BLOOD PRESSURE: 70 MMHG | TEMPERATURE: 97.8 F | HEIGHT: 67 IN | SYSTOLIC BLOOD PRESSURE: 138 MMHG | HEART RATE: 81 BPM

## 2021-11-15 DIAGNOSIS — I10 HYPERTENSION, ESSENTIAL: ICD-10-CM

## 2021-11-15 DIAGNOSIS — E11.9 TYPE 2 DIABETES MELLITUS WITHOUT COMPLICATION, WITHOUT LONG-TERM CURRENT USE OF INSULIN (HCC): ICD-10-CM

## 2021-11-15 DIAGNOSIS — E03.9 ACQUIRED HYPOTHYROIDISM: ICD-10-CM

## 2021-11-15 DIAGNOSIS — E78.2 MIXED HYPERLIPIDEMIA: ICD-10-CM

## 2021-11-15 DIAGNOSIS — Z79.899 ENCOUNTER FOR LONG-TERM (CURRENT) USE OF OTHER MEDICATIONS: Primary | ICD-10-CM

## 2021-11-15 PROBLEM — J30.9 ALLERGIC RHINITIS: Status: ACTIVE | Noted: 2021-11-15

## 2021-11-15 PROBLEM — E78.5 HYPERLIPIDEMIA: Status: ACTIVE | Noted: 2021-11-15

## 2021-11-15 PROBLEM — L30.9 ECZEMATOUS DERMATITIS: Status: ACTIVE | Noted: 2020-03-16

## 2021-11-15 PROBLEM — K51.90 COLITIS, ULCERATIVE: Status: ACTIVE | Noted: 2021-11-15

## 2021-11-15 LAB
ALBUMIN UR-MCNC: <1.2 MG/DL
CREAT UR-MCNC: 36 MG/DL
HBA1C MFR BLD: 5.9 % (ref 4.8–5.6)
MICROALBUMIN/CREAT UR: NORMAL MG/G{CREAT}

## 2021-11-15 PROCEDURE — 82570 ASSAY OF URINE CREATININE: CPT | Performed by: FAMILY MEDICINE

## 2021-11-15 PROCEDURE — 99214 OFFICE O/P EST MOD 30 MIN: CPT | Performed by: FAMILY MEDICINE

## 2021-11-15 PROCEDURE — 83036 HEMOGLOBIN GLYCOSYLATED A1C: CPT | Performed by: FAMILY MEDICINE

## 2021-11-15 PROCEDURE — 82043 UR ALBUMIN QUANTITATIVE: CPT | Performed by: FAMILY MEDICINE

## 2021-11-15 RX ORDER — POLYETHYLENE GLYCOL 3350 17 G/17G
17 POWDER, FOR SOLUTION ORAL
COMMUNITY

## 2021-11-15 NOTE — PROGRESS NOTES
"Chief Complaint  Diabetes (6 MONTH FOLLOW UP )    SUBJECTIVE  Bruno Morton presents to [facility]  61-year-old type 2 diabetes hypertension tubular adenoma    PAST MEDICAL HISTORY  No Known Allergies     Past Surgical History:   • COLONOSCOPY    2014   • ENDOSCOPY       Social History     Tobacco Use   • Smoking status: Never Smoker   • Smokeless tobacco: Never Used   Substance Use Topics   • Alcohol use: Never       Family History   Problem Relation Age of Onset   • Heart disease Father         Health Maintenance Due   Topic Date Due   • URINE MICROALBUMIN  Never done   • ANNUAL PHYSICAL  Never done   • TDAP/TD VACCINES (1 - Tdap) Never done   • ZOSTER VACCINE (1 of 2) Never done   • HEPATITIS C SCREENING  Never done   • DIABETIC EYE EXAM  Never done   • HEMOGLOBIN A1C  11/04/2021   • LIPID PANEL  11/15/2021      Last Completed Colonoscopy          COLORECTAL CANCER SCREENING (COLONOSCOPY - Every 5 Years) Next due on 11/6/2025    10/17/2014  Outside Procedure: CO COLONOSCOPY,DIAGNOSTIC,CO COLONOSCOPY,BIOPSY                REVIEW OF SYSTEMS  Endocrinologic patient is taking his Metformin 2000 mg daily  GI colonoscopy 2020 due to previous tubular adenoma needs one in 2025 discussed plant-based diet weight loss and exercise discussed low-salt diet  Cardiovascular no chest pain no palpitation exercises emphasized  Respiratory no shortness of breath no dyspnea on exertion    OBJECTIVE  Vitals:    11/15/21 0959   BP: 138/70   Pulse: 81   Temp: 97.8 °F (36.6 °C)   SpO2: 98%   Weight: 82.6 kg (182 lb)   Height: 170.2 cm (67\")     Body mass index is 28.51 kg/m².    PHYSICAL EXAM  General no distress  Skin no cancers or precancerous lesions  Cardiovascular regular rhythm no murmur  Respiratory lungs clear and equal bilaterally  Abdomen soft nontender no hepatosplenomegaly    ASSESSMENT & PLAN  Diagnoses and all orders for this visit:    1. Encounter for long-term (current) use of other " medications (Primary)  -     Microalbumin / Creatinine Urine Ratio - Urine, Clean Catch  -     Hemoglobin A1c    2. Type 2 diabetes mellitus without complication, without long-term current use of insulin (HCC)  -     Microalbumin / Creatinine Urine Ratio - Urine, Clean Catch  -     Hemoglobin A1c    3. Acquired hypothyroidism    4. Mixed hyperlipidemia    5. Hypertension, essential      Type 2 diabetes A1c will be drawn #2 is hypertension discussed salt weight loss of exercise trolled obesity discussed plant-based diet and exercise            Patient was given instructions and counseling regarding his condition or for health maintenance advice. Please see specific information pulled into the AVS if appropriate.

## 2021-11-29 ENCOUNTER — IMMUNIZATION (OUTPATIENT)
Dept: VACCINE CLINIC | Facility: HOSPITAL | Age: 61
End: 2021-11-29

## 2021-11-29 DIAGNOSIS — Z23 NEED FOR VACCINATION: Primary | ICD-10-CM

## 2021-11-29 PROCEDURE — 0064A HC ADM SARSCOV2 50MCG/0.25ML BOOSTER: CPT | Performed by: INTERNAL MEDICINE

## 2021-11-29 PROCEDURE — 91306 HC SARSCOV2 VAC 50MCG/0.25ML IM: CPT | Performed by: INTERNAL MEDICINE

## 2022-05-17 ENCOUNTER — OFFICE VISIT (OUTPATIENT)
Dept: FAMILY MEDICINE CLINIC | Facility: CLINIC | Age: 62
End: 2022-05-17

## 2022-05-17 VITALS
HEIGHT: 67 IN | TEMPERATURE: 97.6 F | DIASTOLIC BLOOD PRESSURE: 70 MMHG | OXYGEN SATURATION: 99 % | WEIGHT: 184 LBS | SYSTOLIC BLOOD PRESSURE: 110 MMHG | BODY MASS INDEX: 28.88 KG/M2 | HEART RATE: 88 BPM

## 2022-05-17 DIAGNOSIS — Z00.00 ANNUAL PHYSICAL EXAM: Primary | ICD-10-CM

## 2022-05-17 DIAGNOSIS — Z12.5 PROSTATE CANCER SCREENING: ICD-10-CM

## 2022-05-17 DIAGNOSIS — E11.65 TYPE 2 DIABETES MELLITUS WITH HYPERGLYCEMIA, WITHOUT LONG-TERM CURRENT USE OF INSULIN: ICD-10-CM

## 2022-05-17 DIAGNOSIS — E78.2 MIXED HYPERLIPIDEMIA: ICD-10-CM

## 2022-05-17 DIAGNOSIS — M25.512 CHRONIC LEFT SHOULDER PAIN: ICD-10-CM

## 2022-05-17 DIAGNOSIS — Z11.59 NEED FOR HEPATITIS C SCREENING TEST: ICD-10-CM

## 2022-05-17 DIAGNOSIS — G89.29 CHRONIC LEFT SHOULDER PAIN: ICD-10-CM

## 2022-05-17 DIAGNOSIS — E66.3 OVERWEIGHT WITH BODY MASS INDEX (BMI) OF 28 TO 28.9 IN ADULT: ICD-10-CM

## 2022-05-17 DIAGNOSIS — E03.9 ACQUIRED HYPOTHYROIDISM: ICD-10-CM

## 2022-05-17 DIAGNOSIS — Z79.899 MEDICATION MANAGEMENT: ICD-10-CM

## 2022-05-17 DIAGNOSIS — I10 HYPERTENSION, ESSENTIAL: ICD-10-CM

## 2022-05-17 LAB
ALBUMIN SERPL-MCNC: 4.5 G/DL (ref 3.5–5.2)
ALBUMIN/GLOB SERPL: 1.3 G/DL
ALP SERPL-CCNC: 56 U/L (ref 39–117)
ALT SERPL W P-5'-P-CCNC: 26 U/L (ref 1–41)
ANION GAP SERPL CALCULATED.3IONS-SCNC: 10.7 MMOL/L (ref 5–15)
AST SERPL-CCNC: 21 U/L (ref 1–40)
BASOPHILS # BLD AUTO: 0.06 10*3/MM3 (ref 0–0.2)
BASOPHILS NFR BLD AUTO: 0.9 % (ref 0–1.5)
BILIRUB BLD-MCNC: NEGATIVE MG/DL
BILIRUB SERPL-MCNC: 1.3 MG/DL (ref 0–1.2)
BUN SERPL-MCNC: 16 MG/DL (ref 8–23)
BUN/CREAT SERPL: 15.7 (ref 7–25)
CALCIUM SPEC-SCNC: 10 MG/DL (ref 8.6–10.5)
CHLORIDE SERPL-SCNC: 101 MMOL/L (ref 98–107)
CHOLEST SERPL-MCNC: 141 MG/DL (ref 0–200)
CLARITY, POC: CLEAR
CO2 SERPL-SCNC: 27.3 MMOL/L (ref 22–29)
COLOR UR: YELLOW
CREAT SERPL-MCNC: 1.02 MG/DL (ref 0.76–1.27)
DEPRECATED RDW RBC AUTO: 42.2 FL (ref 37–54)
EGFRCR SERPLBLD CKD-EPI 2021: 82.1 ML/MIN/1.73
EOSINOPHIL # BLD AUTO: 0.16 10*3/MM3 (ref 0–0.4)
EOSINOPHIL NFR BLD AUTO: 2.5 % (ref 0.3–6.2)
ERYTHROCYTE [DISTWIDTH] IN BLOOD BY AUTOMATED COUNT: 13.2 % (ref 12.3–15.4)
EXPIRATION DATE: ABNORMAL
GLOBULIN UR ELPH-MCNC: 3.4 GM/DL
GLUCOSE SERPL-MCNC: 108 MG/DL (ref 65–99)
GLUCOSE UR STRIP-MCNC: ABNORMAL MG/DL
HBA1C MFR BLD: 6.3 % (ref 4.8–5.6)
HCT VFR BLD AUTO: 46.6 % (ref 37.5–51)
HCV AB SER DONR QL: NORMAL
HDLC SERPL-MCNC: 30 MG/DL (ref 40–60)
HGB BLD-MCNC: 17 G/DL (ref 13–17.7)
IMM GRANULOCYTES # BLD AUTO: 0.02 10*3/MM3 (ref 0–0.05)
IMM GRANULOCYTES NFR BLD AUTO: 0.3 % (ref 0–0.5)
KETONES UR QL: NEGATIVE
LDLC SERPL CALC-MCNC: 77 MG/DL (ref 0–100)
LDLC/HDLC SERPL: 2.36 {RATIO}
LEUKOCYTE EST, POC: NEGATIVE
LYMPHOCYTES # BLD AUTO: 1.85 10*3/MM3 (ref 0.7–3.1)
LYMPHOCYTES NFR BLD AUTO: 28.7 % (ref 19.6–45.3)
Lab: ABNORMAL
MCH RBC QN AUTO: 32 PG (ref 26.6–33)
MCHC RBC AUTO-ENTMCNC: 36.5 G/DL (ref 31.5–35.7)
MCV RBC AUTO: 87.8 FL (ref 79–97)
MONOCYTES # BLD AUTO: 0.53 10*3/MM3 (ref 0.1–0.9)
MONOCYTES NFR BLD AUTO: 8.2 % (ref 5–12)
NEUTROPHILS NFR BLD AUTO: 3.82 10*3/MM3 (ref 1.7–7)
NEUTROPHILS NFR BLD AUTO: 59.4 % (ref 42.7–76)
NITRITE UR-MCNC: NEGATIVE MG/ML
NRBC BLD AUTO-RTO: 0 /100 WBC (ref 0–0.2)
PH UR: 7.5 [PH] (ref 5–8)
PLATELET # BLD AUTO: 257 10*3/MM3 (ref 140–450)
PMV BLD AUTO: 10.3 FL (ref 6–12)
POTASSIUM SERPL-SCNC: 3.9 MMOL/L (ref 3.5–5.2)
PROT SERPL-MCNC: 7.9 G/DL (ref 6–8.5)
PROT UR STRIP-MCNC: NEGATIVE MG/DL
PSA SERPL-MCNC: 1.1 NG/ML (ref 0–4)
RBC # BLD AUTO: 5.31 10*6/MM3 (ref 4.14–5.8)
RBC # UR STRIP: NEGATIVE /UL
SODIUM SERPL-SCNC: 139 MMOL/L (ref 136–145)
SP GR UR: 1.02 (ref 1–1.03)
TRIGL SERPL-MCNC: 201 MG/DL (ref 0–150)
TSH SERPL DL<=0.05 MIU/L-ACNC: 2.52 UIU/ML (ref 0.27–4.2)
UROBILINOGEN UR QL: NORMAL
VLDLC SERPL-MCNC: 34 MG/DL (ref 5–40)
WBC NRBC COR # BLD: 6.44 10*3/MM3 (ref 3.4–10.8)

## 2022-05-17 PROCEDURE — 80061 LIPID PANEL: CPT | Performed by: FAMILY MEDICINE

## 2022-05-17 PROCEDURE — 20610 DRAIN/INJ JOINT/BURSA W/O US: CPT | Performed by: FAMILY MEDICINE

## 2022-05-17 PROCEDURE — 83036 HEMOGLOBIN GLYCOSYLATED A1C: CPT | Performed by: FAMILY MEDICINE

## 2022-05-17 PROCEDURE — 36415 COLL VENOUS BLD VENIPUNCTURE: CPT | Performed by: FAMILY MEDICINE

## 2022-05-17 PROCEDURE — 81003 URINALYSIS AUTO W/O SCOPE: CPT | Performed by: FAMILY MEDICINE

## 2022-05-17 PROCEDURE — 80050 GENERAL HEALTH PANEL: CPT | Performed by: FAMILY MEDICINE

## 2022-05-17 PROCEDURE — G0103 PSA SCREENING: HCPCS | Performed by: FAMILY MEDICINE

## 2022-05-17 PROCEDURE — 86803 HEPATITIS C AB TEST: CPT | Performed by: FAMILY MEDICINE

## 2022-05-17 PROCEDURE — 99396 PREV VISIT EST AGE 40-64: CPT | Performed by: FAMILY MEDICINE

## 2022-05-17 RX ORDER — ATORVASTATIN CALCIUM 20 MG/1
20 TABLET, FILM COATED ORAL DAILY
Qty: 90 TABLET | Refills: 3 | Status: SHIPPED | OUTPATIENT
Start: 2022-05-17

## 2022-05-17 RX ORDER — VALSARTAN AND HYDROCHLOROTHIAZIDE 160; 12.5 MG/1; MG/1
1 TABLET, FILM COATED ORAL DAILY
Qty: 270 TABLET | Refills: 3 | Status: SHIPPED | OUTPATIENT
Start: 2022-05-17

## 2022-05-17 RX ORDER — METFORMIN HYDROCHLORIDE 500 MG/1
2000 TABLET, EXTENDED RELEASE ORAL
Qty: 360 TABLET | Refills: 1 | Status: SHIPPED | OUTPATIENT
Start: 2022-05-17 | End: 2022-11-22 | Stop reason: SDUPTHER

## 2022-05-17 RX ORDER — LEVOTHYROXINE SODIUM 0.05 MG/1
50 TABLET ORAL
Qty: 90 TABLET | Refills: 3 | Status: SHIPPED | OUTPATIENT
Start: 2022-05-17

## 2022-05-17 RX ADMIN — METHYLPREDNISOLONE ACETATE 20 MG: 40 INJECTION, SUSPENSION INTRA-ARTICULAR; INTRALESIONAL; INTRAMUSCULAR; SOFT TISSUE at 10:19

## 2022-05-17 NOTE — PROGRESS NOTES
"Chief Complaint  Annual Exam and Med Management    SUBJECTIVE  Bruno Morton presents to [facility]    Patient is 62 years old history of hypertension hyperlipidemia hypothyroidism type 2 diabetes here for his annual exam patient had tubular adenomas before had a colonoscopy last in 2020 due in 2025    PAST MEDICAL HISTORY  No Known Allergies     Past Surgical History:   • COLONOSCOPY    2014   • ENDOSCOPY       Social History     Tobacco Use   • Smoking status: Never Smoker   • Smokeless tobacco: Never Used   Substance Use Topics   • Alcohol use: Never       Family History   Problem Relation Age of Onset   • Heart disease Father         Health Maintenance Due   Topic Date Due   • TDAP/TD VACCINES (1 - Tdap) Never done   • ZOSTER VACCINE (1 of 2) Never done   • Pneumococcal Vaccine 0-64 (2 - PCV) 02/21/2018   • HEPATITIS C SCREENING  Never done   • DIABETIC EYE EXAM  Never done   • LIPID PANEL  11/15/2021   • DIABETIC FOOT EXAM  05/12/2022   • HEMOGLOBIN A1C  05/15/2022        Last Completed Colonoscopy          COLORECTAL CANCER SCREENING (COLONOSCOPY - Every 5 Years) Next due on 11/6/2025    10/17/2014  Outside Procedure: [place]COPY,[place]AG[place]IC,[place] COLONOSCOPY,[place]OPSY                REVIEW OF SYSTEMS    Cardiovascular no chest pain no palpitations  Respiratory no shortness of breath no dyspnea exertion  GI history of colon polyps last colonoscopy 2020 due again 2025  Musculoskeletal pain left shoulder will inject with Depo-Medrol  OBJECTIVE  Vitals:    05/17/22 0923   BP: 110/70   Pulse: 88   Temp: 97.6 °F (36.4 °C)   SpO2: 99%   Weight: 83.5 kg (184 lb)   Height: 170.2 cm (67\")     Body mass index is 28.82 kg/m².    PHYSICAL EXAM    General no distress  HEENT sclera conjunctiva clear TMs negative lesions  Neck no adenopathy or thyromegaly  Lungs clear and equal bilaterally  Cardiovascular regular rhythm no murmur  Abdomen soft nontender no pedal splenomegaly  Rectal 1+ " prostate hypertrophy no nodules  Skin no cancerous or precancerous lesions  Musculoskeletal tenderness left shoulder none over the bicipital tendon or the AC joint  Neurologic filament test was good could detect 5 of 5 on the left and 5 of 5 on the right  Procedure after careful chlorhexidine prep patient had 20 mg of Depo-Medrol injected into the left glenohumeral joint ASSESSMENT & PLAN  Diagnoses and all orders for this visit:    1. Annual physical exam (Primary)  -     TSH  -     Comprehensive Metabolic Panel  -     PSA Screen  -     Lipid Panel  -     CBC & Differential  -     Hemoglobin A1c    2. Prostate cancer screening  -     PSA Screen    3. Need for hepatitis C screening test  -     Hepatitis C Antibody    4. Mixed hyperlipidemia  -     TSH  -     Comprehensive Metabolic Panel  -     PSA Screen  -     Lipid Panel  -     CBC & Differential  -     Hemoglobin A1c    5. Acquired hypothyroidism  -     TSH  -     Comprehensive Metabolic Panel  -     PSA Screen  -     Lipid Panel  -     CBC & Differential  -     Hemoglobin A1c    6. Hypertension, essential  -     TSH  -     Comprehensive Metabolic Panel  -     PSA Screen  -     Lipid Panel  -     CBC & Differential  -     Hemoglobin A1c    7. Medication management  -     TSH  -     Comprehensive Metabolic Panel  -     PSA Screen  -     Lipid Panel  -     CBC & Differential  -     Hemoglobin A1c    8. Type 2 diabetes mellitus with hyperglycemia, without long-term current use of insulin (HCC)  -     TSH  -     Comprehensive Metabolic Panel  -     PSA Screen  -     Lipid Panel  -     CBC & Differential  -     Hemoglobin A1c    Other orders  -     atorvastatin (LIPITOR) 20 MG tablet; Take 1 tablet by mouth Daily.  Dispense: 90 tablet; Refill: 3  -     empagliflozin (JARDIANCE) 10 MG tablet tablet; Take 1 tablet by mouth Every Morning.  Dispense: 90 tablet; Refill: 1  -     levothyroxine (SYNTHROID, LEVOTHROID) 50 MCG tablet; Take 1 tablet by mouth Daily.  Dispense:  90 tablet; Refill: 3  -     valsartan-hydrochlorothiazide (DIOVAN-HCT) 160-12.5 MG per tablet; Take 1 tablet by mouth Daily.  Dispense: 270 tablet; Refill: 3  -     metFORMIN ER (GLUCOPHAGE-XR) 500 MG 24 hr tablet; Take 4 tablets (2000mg) by mouth once daily with the evening meal  Dispense: 360 tablet; Refill: 1          Type 2 diabetes needs an A1c hypercholesterolemia on atorvastatin needs a lipid and CMP left shoulder pain injection of 20 mg of Depo-Medrol overweight needs lose 15 pounds plant-based diet hypothyroidism continue Synthroid needs a TSH prostate screen rectal was negative needs a PSA    Arthrocentesis    Date/Time: 5/17/2022 10:19 AM  Performed by: Alejo Wood III, MD  Authorized by: Alejo Wood III, MD   Indications: pain   Body area: shoulder  Joint: left shoulder  Local anesthesia used: yes    Anesthesia:  Local anesthesia used: yes  Local Anesthetic: lidocaine 1% without epinephrine  Anesthetic total: 1 mL    Sedation:  Patient sedated: no    Preparation: Patient was prepped and draped in the usual sterile fashion.  Needle gauge: 27G.  Meds administered: 20 mg methylPREDNISolone acetate 40 MG/ML  Patient tolerance: patient tolerated the procedure well with no immediate complications             Patient was given instructions and counseling regarding his condition or for health maintenance advice. Please see specific information pulled into the AVS if appropriate.

## 2022-05-18 PROBLEM — D12.6 TUBULAR ADENOMA OF COLON: Status: ACTIVE | Noted: 2022-05-18

## 2022-05-18 RX ORDER — METHYLPREDNISOLONE ACETATE 40 MG/ML
20 INJECTION, SUSPENSION INTRA-ARTICULAR; INTRALESIONAL; INTRAMUSCULAR; SOFT TISSUE
Status: COMPLETED | OUTPATIENT
Start: 2022-05-17 | End: 2022-05-17

## 2022-08-23 ENCOUNTER — TELEPHONE (OUTPATIENT)
Dept: FAMILY MEDICINE CLINIC | Facility: CLINIC | Age: 62
End: 2022-08-23

## 2022-08-31 RX ORDER — BENZONATATE 100 MG/1
100 CAPSULE ORAL 3 TIMES DAILY PRN
Qty: 30 CAPSULE | Refills: 1 | Status: SHIPPED | OUTPATIENT
Start: 2022-08-31

## 2022-09-19 ENCOUNTER — OFFICE VISIT (OUTPATIENT)
Dept: FAMILY MEDICINE CLINIC | Facility: CLINIC | Age: 62
End: 2022-09-19

## 2022-09-19 VITALS
WEIGHT: 186 LBS | OXYGEN SATURATION: 97 % | TEMPERATURE: 98.1 F | HEIGHT: 67 IN | DIASTOLIC BLOOD PRESSURE: 72 MMHG | SYSTOLIC BLOOD PRESSURE: 118 MMHG | BODY MASS INDEX: 29.19 KG/M2 | HEART RATE: 86 BPM

## 2022-09-19 DIAGNOSIS — R49.0 HOARSENESS: ICD-10-CM

## 2022-09-19 DIAGNOSIS — H61.23 BILATERAL IMPACTED CERUMEN: ICD-10-CM

## 2022-09-19 DIAGNOSIS — R05.9 COUGH: Primary | ICD-10-CM

## 2022-09-19 LAB
EXPIRATION DATE: NORMAL
INTERNAL CONTROL: NORMAL
Lab: NORMAL
SARS-COV-2 AG UPPER RESP QL IA.RAPID: NOT DETECTED

## 2022-09-19 PROCEDURE — 99214 OFFICE O/P EST MOD 30 MIN: CPT | Performed by: FAMILY MEDICINE

## 2022-09-19 PROCEDURE — 87426 SARSCOV CORONAVIRUS AG IA: CPT | Performed by: FAMILY MEDICINE

## 2022-09-19 PROCEDURE — 69209 REMOVE IMPACTED EAR WAX UNI: CPT | Performed by: FAMILY MEDICINE

## 2022-09-19 RX ORDER — PROMETHAZINE HYDROCHLORIDE AND CODEINE PHOSPHATE 6.25; 1 MG/5ML; MG/5ML
5 SOLUTION ORAL EVERY 4 HOURS PRN
Qty: 118 ML | Refills: 1 | Status: SHIPPED | OUTPATIENT
Start: 2022-09-19

## 2022-09-19 NOTE — PROGRESS NOTES
"Chief Complaint  Cough (No fever) and Hoarse    SUBJECTIVE  Bruno Morton presents to [facility]    Patient is 62 years old history of hypertension type 2 diabetes previous history of colitis patient is got a cough and hoarseness COVID test done here in the office is negative is vaccinated boosted with COVID    PAST MEDICAL HISTORY  No Known Allergies     Past Surgical History:   • COLONOSCOPY    2014   • ENDOSCOPY       Social History     Tobacco Use   • Smoking status: Never Smoker   • Smokeless tobacco: Never Used   Substance Use Topics   • Alcohol use: Never       Family History   Problem Relation Age of Onset   • Heart disease Father         Health Maintenance Due   Topic Date Due   • TDAP/TD VACCINES (1 - Tdap) Never done   • ZOSTER VACCINE (1 of 2) Never done   • Pneumococcal Vaccine 0-64 (2 - PCV) 02/21/2018   • DIABETIC EYE EXAM  Never done   • DIABETIC FOOT EXAM  05/12/2022        Last Completed Colonoscopy          COLORECTAL CANCER SCREENING (COLONOSCOPY - Every 5 Years) Next due on 11/6/2025    10/17/2014  Outside Procedure: [place]OP[place]                REVIEW OF SYSTEMS    ENT decreased hearing bilaterally wants his ears checked history of cerumen impaction  Respiratory no shortness of breath or dyspnea  ENT hoarseness    OBJECTIVE  Vitals:    09/19/22 1000   BP: 118/72   Pulse: 86   Temp: 98.1 °F (36.7 °C)   SpO2: 97%   Weight: 84.4 kg (186 lb)   Height: 170.2 cm (67\")     Body mass index is 29.13 kg/m².    PHYSICAL EXAM    General no distress  Cardiovascular regular no murmur  Respiratory lungs clinical bilaterally  ENT obvious hoarseness bilateral cerumen impactions    Procedure patient had both ears irrigated out and was checked thereafter and was fine tolerated procedure well inspected afterwards and was dried with a tampon    ASSESSMENT & PLAN  Diagnoses and all orders for this visit:    1. Cough (Primary)  -     POCT " SARS-CoV-2 Antigen CADY  -     promethazine-codeine (PHENERGAN with CODEINE) 6.25-10 MG/5ML solution; Take 5 mL by mouth Every 4 (Four) Hours As Needed for Cough.  Dispense: 118 mL; Refill: 1    2. Hoarseness  -     POCT SARS-CoV-2 Antigen CADY          Viral URI will give Phenergan with codeine and bilateral cerumen impactions both irrigated out and ears are dry at and inspected            Patient was given instructions and counseling regarding his condition or for health maintenance advice. Please see specific information pulled into the AVS if appropriate.

## 2022-09-19 NOTE — PROGRESS NOTES
Procedure   Ear Cerumen Removal    Date/Time: 9/19/2022 3:07 PM  Performed by: Alejo Wood III, MD  Authorized by: Alejo Wood III, MD     Anesthesia:  Local Anesthetic: none  Location details: left ear  Patient tolerance: patient tolerated the procedure well with no immediate complications  Procedure type: irrigation   Sedation:  Patient sedated: no      Ear Cerumen Removal    Date/Time: 9/19/2022 3:08 PM  Performed by: Alejo Wood III, MD  Authorized by: Alejo Wood III, MD     Anesthesia:  Local Anesthetic: none  Location details: right ear  Patient tolerance: patient tolerated the procedure well with no immediate complications  Procedure type: instrumentation, curette   Sedation:  Patient sedated: no

## 2022-11-22 ENCOUNTER — LAB (OUTPATIENT)
Dept: LAB | Facility: HOSPITAL | Age: 62
End: 2022-11-22

## 2022-11-22 ENCOUNTER — OFFICE VISIT (OUTPATIENT)
Dept: FAMILY MEDICINE CLINIC | Facility: CLINIC | Age: 62
End: 2022-11-22

## 2022-11-22 VITALS
HEART RATE: 79 BPM | WEIGHT: 182 LBS | OXYGEN SATURATION: 97 % | DIASTOLIC BLOOD PRESSURE: 62 MMHG | SYSTOLIC BLOOD PRESSURE: 112 MMHG | BODY MASS INDEX: 28.56 KG/M2 | HEIGHT: 67 IN | TEMPERATURE: 97.7 F

## 2022-11-22 DIAGNOSIS — Z79.899 MEDICATION MANAGEMENT: ICD-10-CM

## 2022-11-22 DIAGNOSIS — E11.65 TYPE 2 DIABETES MELLITUS WITH HYPERGLYCEMIA, WITHOUT LONG-TERM CURRENT USE OF INSULIN: ICD-10-CM

## 2022-11-22 DIAGNOSIS — I10 HYPERTENSION, ESSENTIAL: ICD-10-CM

## 2022-11-22 DIAGNOSIS — E11.65 TYPE 2 DIABETES MELLITUS WITH HYPERGLYCEMIA, WITHOUT LONG-TERM CURRENT USE OF INSULIN: Primary | ICD-10-CM

## 2022-11-22 DIAGNOSIS — E03.9 ACQUIRED HYPOTHYROIDISM: ICD-10-CM

## 2022-11-22 LAB
ALBUMIN UR-MCNC: <1.2 MG/DL
CREAT UR-MCNC: 78.3 MG/DL
HBA1C MFR BLD: 6 % (ref 4.8–5.6)
MICROALBUMIN/CREAT UR: NORMAL MG/G{CREAT}

## 2022-11-22 PROCEDURE — 83036 HEMOGLOBIN GLYCOSYLATED A1C: CPT

## 2022-11-22 PROCEDURE — 82570 ASSAY OF URINE CREATININE: CPT | Performed by: FAMILY MEDICINE

## 2022-11-22 PROCEDURE — 36415 COLL VENOUS BLD VENIPUNCTURE: CPT

## 2022-11-22 PROCEDURE — 99214 OFFICE O/P EST MOD 30 MIN: CPT | Performed by: FAMILY MEDICINE

## 2022-11-22 PROCEDURE — 82043 UR ALBUMIN QUANTITATIVE: CPT | Performed by: FAMILY MEDICINE

## 2022-11-22 RX ORDER — METFORMIN HYDROCHLORIDE 500 MG/1
2000 TABLET, EXTENDED RELEASE ORAL
Qty: 360 TABLET | Refills: 1 | Status: SHIPPED | OUTPATIENT
Start: 2022-11-22

## 2022-11-22 NOTE — PROGRESS NOTES
"Chief Complaint  Diabetes and Shoulder Pain (Bilateral shoulder pain and decreased rom and can not list things.  Right > left )    SUBJECTIVE  Bruno Morton presents to [facility]    Patient is a 62 years old type 2 diabetes hypertension hypothyroidism on Synthroid tubular adenoma colon some pain in his right shoulder    PAST MEDICAL HISTORY  No Known Allergies     Past Surgical History:   • COLONOSCOPY    2014   • ENDOSCOPY       Social History     Tobacco Use   • Smoking status: Never   • Smokeless tobacco: Never   Substance Use Topics   • Alcohol use: Never       Family History   Problem Relation Age of Onset   • Heart disease Father         Health Maintenance Due   Topic Date Due   • TDAP/TD VACCINES (1 - Tdap) Never done   • ZOSTER VACCINE (1 of 2) Never done   • Pneumococcal Vaccine 0-64 (2 - PCV) 02/21/2018   • Hepatitis B (1 of 3 - Risk 3-dose series) Never done   • DIABETIC EYE EXAM  Never done   • DIABETIC FOOT EXAM  05/12/2022   • COVID-19 Vaccine (5 - Booster) 09/20/2022   • URINE MICROALBUMIN  11/15/2022   • HEMOGLOBIN A1C  11/17/2022        Last Completed Colonoscopy          COLORECTAL CANCER SCREENING (COLONOSCOPY - Every 5 Years) Next due on 11/6/2025    10/17/2014  Outside Procedure: [place] COL[place]AG[place]IC,[place] COLONOSCOPY,[place]OPSY                REVIEW OF SYSTEMS    GI discussed plant-based diet patient a colonoscopy in 2020 due again in 2025 no musculoskeletal pain in both shoulders right greater than left not keeping him from doing anything  Endocrine patient has yearly eye exam keeps his feet moisturized did get his COVID and flu shots    OBJECTIVE  Vitals:    11/22/22 0913   BP: 112/62   Pulse: 79   Temp: 97.7 °F (36.5 °C)   SpO2: 97%   Weight: 82.6 kg (182 lb)   Height: 170.2 cm (67\")     Body mass index is 28.51 kg/m².    PHYSICAL EXAM    General no distress  Cardiovascular regular rhythm no murmur  Respiratory lungs clinical bilaterally  Abdomen soft " nontender no pedal splenomegaly    ASSESSMENT & PLAN  Diagnoses and all orders for this visit:    1. Type 2 diabetes mellitus with hyperglycemia, without long-term current use of insulin (HCC) (Primary)  -     Cancel: Hemoglobin A1c  -     Microalbumin / Creatinine Urine Ratio - Urine, Clean Catch  -     Hemoglobin A1c; Future    2. Hypertension, essential    3. Acquired hypothyroidism    4. Medication management  -     Cancel: Hemoglobin A1c  -     Microalbumin / Creatinine Urine Ratio - Urine, Clean Catch  -     Hemoglobin A1c; Future    Other orders  -     empagliflozin (JARDIANCE) 10 MG tablet tablet; Take 1 tablet by mouth Every Morning.  Dispense: 90 tablet; Refill: 1  -     metFORMIN ER (GLUCOPHAGE-XR) 500 MG 24 hr tablet; Take 4 tablets (2000mg) by mouth once daily with the evening meal  Dispense: 360 tablet; Refill: 1          Type 2 diabetes needs an A1c hypertension well controlled obesity lose 15 pounds tubular adenoma colonoscopy 2025 colonoscopy 2020 was negative            Patient was given instructions and counseling regarding his condition or for health maintenance advice. Please see specific information pulled into the AVS if appropriate.   Answers for HPI/ROS submitted by the patient on 11/21/2022  What is the primary reason for your visit?: Diabetes  Diabetes type: type 2  MedicAlert ID: No  Disease duration: 5 Years  blurred vision: No  chest pain: No  fatigue: No  foot paresthesias: No  foot ulcerations: No  polydipsia: No  polyphagia: No  polyuria: No  visual change: No  weakness: No  weight loss: No  Symptom course: stable  confusion: No  dizziness: No  headaches: No  hunger: No  mood changes: No  nervous/anxious: No  pallor: No  seizures: No  sleepiness: No  speech difficulty: No  sweats: No  tremors: No  blackouts: No  hospitalization: No  nocturnal hypoglycemia: No  required assistance: No  required glucagon: No  CVA: No  heart disease: No  impotence: No  nephropathy: No  peripheral  neuropathy: No  PVD: No  retinopathy: No  Current treatments: oral agent (dual therapy)  Treatment compliance: all of the time  Monitoring compliance: good  Blood glucose trend: no change  Weight trend: stable  Exercise: rarely  Dietitian visit: No  Eye exam current: Yes  Sees podiatrist: No

## 2023-06-06 ENCOUNTER — CLINICAL SUPPORT (OUTPATIENT)
Dept: FAMILY MEDICINE CLINIC | Facility: CLINIC | Age: 63
End: 2023-06-06
Payer: COMMERCIAL

## 2023-06-06 ENCOUNTER — OFFICE VISIT (OUTPATIENT)
Dept: FAMILY MEDICINE CLINIC | Facility: CLINIC | Age: 63
End: 2023-06-06
Payer: COMMERCIAL

## 2023-06-06 VITALS
SYSTOLIC BLOOD PRESSURE: 122 MMHG | HEART RATE: 94 BPM | DIASTOLIC BLOOD PRESSURE: 70 MMHG | BODY MASS INDEX: 28.88 KG/M2 | HEIGHT: 67 IN | TEMPERATURE: 98.1 F | OXYGEN SATURATION: 96 % | WEIGHT: 184 LBS

## 2023-06-06 DIAGNOSIS — Z79.899 MEDICATION MANAGEMENT: ICD-10-CM

## 2023-06-06 DIAGNOSIS — I10 HYPERTENSION, ESSENTIAL: ICD-10-CM

## 2023-06-06 DIAGNOSIS — E11.65 TYPE 2 DIABETES MELLITUS WITH HYPERGLYCEMIA, WITHOUT LONG-TERM CURRENT USE OF INSULIN: ICD-10-CM

## 2023-06-06 DIAGNOSIS — E03.9 ACQUIRED HYPOTHYROIDISM: ICD-10-CM

## 2023-06-06 DIAGNOSIS — E78.2 MIXED HYPERLIPIDEMIA: ICD-10-CM

## 2023-06-06 DIAGNOSIS — Z00.00 ANNUAL PHYSICAL EXAM: ICD-10-CM

## 2023-06-06 DIAGNOSIS — Z12.5 PROSTATE CANCER SCREENING: ICD-10-CM

## 2023-06-06 DIAGNOSIS — Z00.00 ANNUAL PHYSICAL EXAM: Primary | ICD-10-CM

## 2023-06-06 DIAGNOSIS — E11.65 TYPE 2 DIABETES MELLITUS WITH HYPERGLYCEMIA, WITHOUT LONG-TERM CURRENT USE OF INSULIN: Primary | ICD-10-CM

## 2023-06-06 DIAGNOSIS — Z79.899 ENCOUNTER FOR LONG-TERM (CURRENT) USE OF OTHER MEDICATIONS: ICD-10-CM

## 2023-06-06 LAB
ALBUMIN SERPL-MCNC: 4.5 G/DL (ref 3.5–5.2)
ALBUMIN/GLOB SERPL: 1.7 G/DL
ALP SERPL-CCNC: 57 U/L (ref 39–117)
ALT SERPL W P-5'-P-CCNC: 28 U/L (ref 1–41)
ANION GAP SERPL CALCULATED.3IONS-SCNC: 12.4 MMOL/L (ref 5–15)
AST SERPL-CCNC: 14 U/L (ref 1–40)
BASOPHILS # BLD AUTO: 0.07 10*3/MM3 (ref 0–0.2)
BASOPHILS NFR BLD AUTO: 0.9 % (ref 0–1.5)
BILIRUB BLD-MCNC: NEGATIVE MG/DL
BILIRUB SERPL-MCNC: 0.9 MG/DL (ref 0–1.2)
BUN SERPL-MCNC: 15 MG/DL (ref 8–23)
BUN/CREAT SERPL: 13.5 (ref 7–25)
CALCIUM SPEC-SCNC: 9.8 MG/DL (ref 8.6–10.5)
CHLORIDE SERPL-SCNC: 101 MMOL/L (ref 98–107)
CHOLEST SERPL-MCNC: 139 MG/DL (ref 0–200)
CLARITY, POC: CLEAR
CO2 SERPL-SCNC: 26.6 MMOL/L (ref 22–29)
COLOR UR: YELLOW
CREAT SERPL-MCNC: 1.11 MG/DL (ref 0.76–1.27)
DEPRECATED RDW RBC AUTO: 43 FL (ref 37–54)
EGFRCR SERPLBLD CKD-EPI 2021: 73.7 ML/MIN/1.73
EOSINOPHIL # BLD AUTO: 0.2 10*3/MM3 (ref 0–0.4)
EOSINOPHIL NFR BLD AUTO: 2.6 % (ref 0.3–6.2)
ERYTHROCYTE [DISTWIDTH] IN BLOOD BY AUTOMATED COUNT: 13.5 % (ref 12.3–15.4)
EXPIRATION DATE: ABNORMAL
GLOBULIN UR ELPH-MCNC: 2.6 GM/DL
GLUCOSE SERPL-MCNC: 119 MG/DL (ref 65–99)
GLUCOSE UR STRIP-MCNC: ABNORMAL MG/DL
HBA1C MFR BLD: 6.4 % (ref 4.8–5.6)
HCT VFR BLD AUTO: 47.3 % (ref 37.5–51)
HDLC SERPL-MCNC: 25 MG/DL (ref 40–60)
HGB BLD-MCNC: 16.3 G/DL (ref 13–17.7)
IMM GRANULOCYTES # BLD AUTO: 0.03 10*3/MM3 (ref 0–0.05)
IMM GRANULOCYTES NFR BLD AUTO: 0.4 % (ref 0–0.5)
KETONES UR QL: NEGATIVE
LDLC SERPL CALC-MCNC: 73 MG/DL (ref 0–100)
LDLC/HDLC SERPL: 2.56 {RATIO}
LEUKOCYTE EST, POC: NEGATIVE
LYMPHOCYTES # BLD AUTO: 2.09 10*3/MM3 (ref 0.7–3.1)
LYMPHOCYTES NFR BLD AUTO: 27.5 % (ref 19.6–45.3)
Lab: ABNORMAL
MCH RBC QN AUTO: 30.5 PG (ref 26.6–33)
MCHC RBC AUTO-ENTMCNC: 34.5 G/DL (ref 31.5–35.7)
MCV RBC AUTO: 88.6 FL (ref 79–97)
MONOCYTES # BLD AUTO: 0.6 10*3/MM3 (ref 0.1–0.9)
MONOCYTES NFR BLD AUTO: 7.9 % (ref 5–12)
NEUTROPHILS NFR BLD AUTO: 4.61 10*3/MM3 (ref 1.7–7)
NEUTROPHILS NFR BLD AUTO: 60.7 % (ref 42.7–76)
NITRITE UR-MCNC: NEGATIVE MG/ML
NRBC BLD AUTO-RTO: 0 /100 WBC (ref 0–0.2)
PH UR: 6 [PH] (ref 5–8)
PLATELET # BLD AUTO: 280 10*3/MM3 (ref 140–450)
PMV BLD AUTO: 10.1 FL (ref 6–12)
POTASSIUM SERPL-SCNC: 4 MMOL/L (ref 3.5–5.2)
PROT SERPL-MCNC: 7.1 G/DL (ref 6–8.5)
PROT UR STRIP-MCNC: NEGATIVE MG/DL
PSA SERPL-MCNC: 1.07 NG/ML (ref 0–4)
RBC # BLD AUTO: 5.34 10*6/MM3 (ref 4.14–5.8)
RBC # UR STRIP: NEGATIVE /UL
SODIUM SERPL-SCNC: 140 MMOL/L (ref 136–145)
SP GR UR: 1.01 (ref 1–1.03)
TRIGL SERPL-MCNC: 250 MG/DL (ref 0–150)
TSH SERPL DL<=0.05 MIU/L-ACNC: 3.43 UIU/ML (ref 0.27–4.2)
UROBILINOGEN UR QL: ABNORMAL
VLDLC SERPL-MCNC: 41 MG/DL (ref 5–40)
WBC NRBC COR # BLD: 7.6 10*3/MM3 (ref 3.4–10.8)

## 2023-06-06 PROCEDURE — 83036 HEMOGLOBIN GLYCOSYLATED A1C: CPT | Performed by: FAMILY MEDICINE

## 2023-06-06 PROCEDURE — 80061 LIPID PANEL: CPT | Performed by: FAMILY MEDICINE

## 2023-06-06 PROCEDURE — 80050 GENERAL HEALTH PANEL: CPT | Performed by: FAMILY MEDICINE

## 2023-06-06 PROCEDURE — G0103 PSA SCREENING: HCPCS | Performed by: FAMILY MEDICINE

## 2023-06-06 RX ORDER — VALSARTAN AND HYDROCHLOROTHIAZIDE 160; 12.5 MG/1; MG/1
1 TABLET, FILM COATED ORAL DAILY
Qty: 270 TABLET | Refills: 3 | Status: SHIPPED | OUTPATIENT
Start: 2023-06-06

## 2023-06-06 RX ORDER — ATORVASTATIN CALCIUM 20 MG/1
20 TABLET, FILM COATED ORAL DAILY
Qty: 90 TABLET | Refills: 3 | Status: SHIPPED | OUTPATIENT
Start: 2023-06-06

## 2023-06-06 RX ORDER — LEVOTHYROXINE SODIUM 0.05 MG/1
50 TABLET ORAL
Qty: 90 TABLET | Refills: 3 | Status: SHIPPED | OUTPATIENT
Start: 2023-06-06

## 2023-06-06 RX ORDER — METFORMIN HYDROCHLORIDE 500 MG/1
2000 TABLET, EXTENDED RELEASE ORAL
Qty: 360 TABLET | Refills: 3 | Status: SHIPPED | OUTPATIENT
Start: 2023-06-06

## 2023-06-09 ENCOUNTER — TELEPHONE (OUTPATIENT)
Dept: FAMILY MEDICINE CLINIC | Facility: CLINIC | Age: 63
End: 2023-06-09
Payer: COMMERCIAL

## 2023-06-09 NOTE — TELEPHONE ENCOUNTER
Patient called and you put in order for the sleep clinic he wants to know if he can do a home sleep test instead it you think it is ok.

## 2023-06-12 NOTE — TELEPHONE ENCOUNTER
The patient is an told him through the sleep clinic at [facility] we will set him up for his home test

## 2023-06-12 NOTE — PROGRESS NOTES
"Chief Complaint  Annual Exam (Here for complete physical)    SUBJECTIVE  Bruno Morton presents to [facility]    Patient is a 63-year-old history of elevated cholesterol hypertension type 2 diabetes obesity BMI is 28 hypothyroidism on Synthroid history of colon polyps had last colonoscopy in 2020 due again 2025 had no polyps in 2020  PAST MEDICAL HISTORY  No Known Allergies     Past Surgical History:    COLONOSCOPY    2014    ENDOSCOPY       Social History     Tobacco Use    Smoking status: Never    Smokeless tobacco: Never   Substance Use Topics    Alcohol use: Never       Family History   Problem Relation Age of Onset    Heart disease Father         Health Maintenance Due   Topic Date Due    TDAP/TD VACCINES (1 - Tdap) Never done    ZOSTER VACCINE (1 of 2) Never done    Pneumococcal Vaccine 65+ (2 - PCV) 02/21/2018    Hepatitis B (1 of 3 - Risk 3-dose series) Never done    DIABETIC FOOT EXAM  05/12/2022    COVID-19 Vaccine (6 - Moderna series) 05/08/2023        Last Completed Colonoscopy            COLORECTAL CANCER SCREENING (COLONOSCOPY - Every 5 Years) Next due on 11/6/2025      10/17/2014  Outside Procedure: [place]IC,[place] COL[place]OSCOPY,[place]OPSY                    REVIEW OF SYSTEMS    Cardiovascular no chest pain no palpitations  Respiratory no shortness of breath or dyspnea exertion  GI discussed plant-based diet discussed 15 pound weight loss    OBJECTIVE  Vitals:    06/06/23 1616   BP: 122/70   Pulse: 94   Temp: 98.1 °F (36.7 °C)   SpO2: 96%   Weight: 83.5 kg (184 lb)   Height: 170.2 cm (67\")     Body mass index is 28.82 kg/m².    PHYSICAL EXAM    Enteral no distress  Cardiovascular regular rhythm no murmur  Respiratory lungs clear and equal bilaterally  Abdomen soft nontender no hepatosplenomegaly    Rectal 1+ prostate hypertrophy no nodules no asymmetry   neurologic mentation normal speech normal gait is normal    ASSESSMENT & PLAN  Diagnoses and all orders " for this visit:    1. Annual physical exam (Primary)  -     POC Urinalysis Dipstick, Automated  -     Ambulatory Referral to Sleep Medicine    2. Type 2 diabetes mellitus with hyperglycemia, without long-term current use of insulin  -     PHARMACOGENOMICS PROFILE,BIOTAP - Swab,; Future  -     empagliflozin (JARDIANCE) 10 MG tablet tablet; Take 1 tablet by mouth Every Morning.  Dispense: 90 tablet; Refill: 3  -     metFORMIN ER (GLUCOPHAGE-XR) 500 MG 24 hr tablet; Take 4 tablets (2000mg) by mouth once daily with the evening meal  Dispense: 360 tablet; Refill: 3    3. Hypertension, essential  -     PHARMACOGENOMICS PROFILE,BIOTAP - Swab,; Future  -     POC Urinalysis Dipstick, Automated  -     valsartan-hydrochlorothiazide (DIOVAN-HCT) 160-12.5 MG per tablet; Take 1 tablet by mouth Daily.  Dispense: 270 tablet; Refill: 3    4. Acquired hypothyroidism  -     levothyroxine (SYNTHROID, LEVOTHROID) 50 MCG tablet; Take 1 tablet by mouth Daily.  Dispense: 90 tablet; Refill: 3    5. Medication management  -     POC Urinalysis Dipstick, Automated    6. Mixed hyperlipidemia  -     PHARMACOGENOMICS PROFILE,BIOTAP - Swab,; Future  -     atorvastatin (LIPITOR) 20 MG tablet; Take 1 tablet by mouth Daily.  Dispense: 90 tablet; Refill: 3          Type 2 diabetes needs an [id number] cholesterol needs a lipid CMP snoring need to refer to sleep medicine can schedule a home test hypertension good control colon polyps last colonoscopy 2020 due again 2025 obesity BMI 28 lose weight            Patient was given instructions and counseling regarding his condition or for health maintenance advice. Please see specific information pulled into the AVS if appropriate.

## 2023-06-14 ENCOUNTER — OFFICE VISIT (OUTPATIENT)
Dept: FAMILY MEDICINE CLINIC | Facility: CLINIC | Age: 63
End: 2023-06-14
Payer: COMMERCIAL

## 2023-06-14 VITALS
WEIGHT: 185 LBS | DIASTOLIC BLOOD PRESSURE: 72 MMHG | OXYGEN SATURATION: 99 % | HEIGHT: 67 IN | HEART RATE: 96 BPM | BODY MASS INDEX: 29.03 KG/M2 | TEMPERATURE: 97.9 F | SYSTOLIC BLOOD PRESSURE: 118 MMHG

## 2023-06-14 DIAGNOSIS — S70.362A TICK BITE OF LEFT THIGH, INITIAL ENCOUNTER: ICD-10-CM

## 2023-06-14 DIAGNOSIS — T14.8XXA FOREIGN BODY IN SKIN: Primary | ICD-10-CM
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
## 2023-06-14 RX ORDER — DOXYCYCLINE 100 MG/1
100 CAPSULE ORAL 2 TIMES DAILY
Qty: 28 CAPSULE | Refills: 0
Start: 2023-06-14 | End: 2023-06-28

## 2023-06-14 NOTE — PROGRESS NOTES
"Chief Complaint  Tick Removal (Left inner thigh, pulled tick out but did not get the head out.  Area is raised and red. No fever. )    SUBJECTIVE  Bruno Morton presents to [facility]    Patient is a 63-year-old old had a tick removed from his left inner thigh head was left in area is raised and red discussed what to do if he had fever afterwards discussed doxycycline wrote a prescription for doxycycline but told him not to use it unless he started running a fever and then to get checked    PAST MEDICAL HISTORY  No Known Allergies     Past Surgical History:    COLONOSCOPY    2014    ENDOSCOPY       Social History     Tobacco Use    Smoking status: Never    Smokeless tobacco: Never   Substance Use Topics    Alcohol use: Never       Family History   Problem Relation Age of Onset    Heart disease Father         Health Maintenance Due   Topic Date Due    TDAP/TD VACCINES (1 - Tdap) Never done    ZOSTER VACCINE (1 of 2) Never done    Pneumococcal Vaccine 65+ (2 - PCV) 02/21/2018    Hepatitis B (1 of 3 - Risk 3-dose series) Never done    DIABETIC FOOT EXAM  05/12/2022    COVID-19 Vaccine (6 - Moderna series) 05/08/2023        Last Completed Colonoscopy            COLORECTAL CANCER SCREENING (COLONOSCOPY - Every 5 Years) Next due on 11/6/2025      10/17/2014  Outside Procedure: [place] COLONOSCOPY,DIAG[place]STIC,[place] COLONOSCOPY,BIOPSY                    REVIEW OF SYSTEMS    Skin left inner thigh tick was removed hips left    OBJECTIVE  Vitals:    06/14/23 1417   BP: 118/72   Pulse: 96   Temp: 97.9 °F (36.6 °C)   SpO2: 99%   Weight: 83.9 kg (185 lb)   Height: 170.2 cm (67\")     Body mass index is 28.98 kg/m².    PHYSICAL EXAM    General no distress  Cardiovascular regular rhythm no murmur  Lungs clear and equal bilaterally  Skin left inner thigh take it is obviously left in the wound erythema about 5 cm x 5 cm around the head of the tick    After careful chlorhexidine prep and 1% Xylocaine " anesthesia the head of the tick was removed area was cleaned again and bandaged again warned if fever to get back with us and start tetracycline doxycycline    ASSESSMENT & PLAN  Diagnoses and all orders for this visit:    1. Foreign body in skin (Primary)  Comments:  tick head removed from left inner thigh    2. Tick bite of left thigh, initial encounter    Other orders  -     Incision & Drainage  -     Foreign Body Removal  -     doxycycline (MONODOX) 100 MG capsule; Take 1 capsule by mouth 2 (Two) Times a Day for 14 days.  Dispense: 28 capsule; Refill: 0          Removal head of the tick from the left inner thigh with incision local anesthesia    Foreign Body Removal    Date/Time: 6/14/2023 2:44 PM  Performed by: Alejo Wood III, MD  Authorized by: Alejo Wood III, MD   Body area: skin  General location: lower extremity  Location details: left upper leg  Anesthesia: local infiltration    Anesthesia:  Local Anesthetic: lidocaine 1% without epinephrine  Anesthetic total: 3 mL    Sedation:  Patient sedated: no    Patient restrained: no  Patient cooperative: yes  Localization method: visualized  Removal mechanism: scalpel  Dressing: dressing applied  Tendon involvement: none  Depth: subcutaneous  Complexity: simple  1 objects recovered.  Objects recovered: tick head  Post-procedure assessment: foreign body removed  Patient tolerance: patient tolerated the procedure well with no immediate complications         Patient was given instructions and counseling regarding his condition or for health maintenance advice. Please see specific information pulled into the AVS if appropriate.

## 2023-07-25 ENCOUNTER — OFFICE VISIT (OUTPATIENT)
Dept: FAMILY MEDICINE CLINIC | Facility: CLINIC | Age: 63
End: 2023-07-25
Payer: COMMERCIAL

## 2023-07-25 VITALS
OXYGEN SATURATION: 98 % | TEMPERATURE: 98 F | DIASTOLIC BLOOD PRESSURE: 70 MMHG | HEART RATE: 75 BPM | BODY MASS INDEX: 29.03 KG/M2 | SYSTOLIC BLOOD PRESSURE: 120 MMHG | WEIGHT: 185 LBS | HEIGHT: 67 IN

## 2023-07-25 DIAGNOSIS — M79.631 RIGHT FOREARM PAIN: Primary | ICD-10-CM

## 2023-07-25 NOTE — PROGRESS NOTES
"Chief Complaint  Arm Pain (Burning pain in right upper arm between wrist and elbow, positional.)    SUBJECTIVE  Bruno Morton presents to [facility]    Patient is a 63-year-old history of type 2 diabetes hypertension hypothyroidism tubular adenoma ulcerative colitis patient did develop some burning pain in his right forearm but none in the shoulder none in the upper arm none in the hand just in the over the dorsum of the right forearm no history of trauma except his dog jumped up on him there no weakness whatsoever no paresthesias in his face or leg    PAST MEDICAL HISTORY  No Known Allergies     Past Surgical History:    COLONOSCOPY    2014    ENDOSCOPY       Social History     Tobacco Use    Smoking status: Never    Smokeless tobacco: Never   Substance Use Topics    Alcohol use: Never       Family History   Problem Relation Age of Onset    Heart disease Father         Health Maintenance Due   Topic Date Due    TDAP/TD VACCINES (1 - Tdap) Never done    ZOSTER VACCINE (1 of 2) Never done    Pneumococcal Vaccine 65+ (2 - PCV) 02/21/2018    Hepatitis B (1 of 3 - Risk 3-dose series) Never done    DIABETIC FOOT EXAM  05/12/2022    COVID-19 Vaccine (6 - Moderna series) 05/08/2023        Last Completed Colonoscopy            COLORECTAL CANCER SCREENING (COLONOSCOPY - Every 5 Years) Next due on 11/6/2025      10/17/2014  Outside Procedure: [place]OPSY                    REVIEW OF SYSTEMS    Neurologic some burning pain to the dorsum of the right forearm over about a 6 inch linear level dog to jump up on him there appears to be a small sensory nerves its been irritated no lack of strength    OBJECTIVE  Vitals:    07/25/23 1029   BP: 120/70   Pulse: 75   Temp: 98 °F (36.7 °C)   SpO2: 98%   Weight: 83.9 kg (185 lb)   Height: 170.2 cm (67\")     Body mass index is 28.98 kg/m².    PHYSICAL EXAM    General no distress  Cardiovascular regular rhythm no " murmur  Lungs clear and equal bilaterally  Neurologic mentation is normal speech is normal gait is normal good strength with handgrip good dorsiflexion of the right wrist    ASSESSMENT & PLAN  Diagnoses and all orders for this visit:    1. Right forearm pain (Primary)          Neuropathic pain right forearm may be due to trauma from his dogs no treatment is necessary no further testing unless this changes            Patient was given instructions and counseling regarding his condition or for health maintenance advice. Please see specific information pulled into the AVS if appropriate.

## 2023-10-09 ENCOUNTER — OFFICE VISIT (OUTPATIENT)
Dept: FAMILY MEDICINE CLINIC | Facility: CLINIC | Age: 63
End: 2023-10-09
Payer: COMMERCIAL

## 2023-10-09 VITALS
TEMPERATURE: 97.3 F | BODY MASS INDEX: 29.38 KG/M2 | HEIGHT: 67 IN | SYSTOLIC BLOOD PRESSURE: 130 MMHG | HEART RATE: 87 BPM | DIASTOLIC BLOOD PRESSURE: 83 MMHG | OXYGEN SATURATION: 98 % | WEIGHT: 187.2 LBS

## 2023-10-09 DIAGNOSIS — Z23 NEED FOR PNEUMOCOCCAL 20-VALENT CONJUGATE VACCINATION: ICD-10-CM

## 2023-10-09 DIAGNOSIS — E03.9 HYPOTHYROIDISM, UNSPECIFIED TYPE: Primary | ICD-10-CM

## 2023-10-09 DIAGNOSIS — Z23 NEED FOR INFLUENZA VACCINATION: ICD-10-CM

## 2023-10-09 DIAGNOSIS — E11.9 TYPE 2 DIABETES MELLITUS WITHOUT COMPLICATION, WITHOUT LONG-TERM CURRENT USE OF INSULIN: ICD-10-CM

## 2023-10-09 NOTE — PROGRESS NOTES
"Chief Complaint  finger pain (Left index finger X 2 weeks.  No swelling, can still use. But increased soreness in joint of finger.)    Subjective      History of Present Illness  Bruno Morton is a 63 y.o. male who presents to [facility] with a past medical history of UC?, DM2, eczematous dermatitis, HTN, Hypothyroidism, patient comes with R second finger pain for the last 2 weeks but today is much better.         Objective   Vital Signs:   Vitals:    10/09/23 0755   BP: 130/83   Pulse: 87   Temp: 97.3 øF (36.3 øC)   SpO2: 98%   Weight: 84.9 kg (187 lb 3.2 oz)   Height: 170.2 cm (67\")       Wt Readings from Last 3 Encounters:   10/09/23 84.9 kg (187 lb 3.2 oz)   07/25/23 83.9 kg (185 lb)   06/14/23 83.9 kg (185 lb)     BP Readings from Last 3 Encounters:   10/09/23 130/83   07/25/23 120/70   06/14/23 118/72         Physical Exam  Vitals reviewed.   HENT:      Head: Normocephalic.      Mouth/Throat:      Mouth: Mucous membranes are moist.   Eyes:      Pupils: Pupils are equal, round, and reactive to light.   Cardiovascular:      Rate and Rhythm: Normal rate.   Abdominal:      General: Abdomen is flat.   Musculoskeletal:         General: Normal range of motion.      Cervical back: Normal range of motion.   Skin:     General: Skin is warm.      Capillary Refill: Capillary refill takes less than 2 seconds.   Neurological:      Mental Status: He is alert.         Result Review :  The following data was reviewed by: Spencer Roberts MD on 10/09/2023:             Assessment and Plan   Diagnoses and all orders for this visit:    1. Hypothyroidism, unspecified type (Primary)  -     TSH; Future  -     TSH    2. Type 2 diabetes mellitus without complication, without long-term current use of insulin  -     Hemoglobin A1c; Future  -     Hemoglobin A1c    3. Need for influenza vaccination  -     Fluzone >6 Months ([id number])    4. Need for pneumococcal 20-valent conjugate " vaccination  -     Pneumococcal Conjugate Vaccine 20-Valent (PCV20)        BMI is >= 25 and <30. (Overweight) The following options were offered after discussion;: weight loss educational material (shared in after visit summary), exercise counseling/recommendations, and nutrition counseling/recommendations         FOLLOW UP  Return in about 3 months (around 1/9/2024).  Patient was given instructions and counseling regarding his condition or for health maintenance advice. Please see specific information pulled into the AVS if appropriate.       Spencer Roberts MD  10/09/23  08:56 EDT    CURRENT & DISCONTINUED MEDICATIONS  Current Outpatient Medications   Medication Instructions    atorvastatin (LIPITOR) 20 mg, Oral, Daily    FreeStyle Unistick II Lancets misc use as directed 3 times weekly    glucose blood (FreeStyle Precision Lc Test) test strip test blood sugar daily    Jardiance 10 mg, Oral, Every Morning    levothyroxine (SYNTHROID, LEVOTHROID) 50 mcg, Oral, Daily - RT    metFORMIN ER (GLUCOPHAGE-XR) 500 MG 24 hr tablet Take 4 tablets (2000mg) by mouth once daily with the evening meal    polyethylene glycol (MIRALAX) 17 g    valsartan-hydrochlorothiazide (DIOVAN-HCT) 160-12.5 MG per tablet 1 tablet, Oral, Daily       Medications Discontinued During This Encounter   Medication Reason    promethazine-codeine (PHENERGAN with CODEINE) 6.25-10 MG/5ML solution *Therapy completed    benzonatate (Tessalon Perles) 100 MG capsule *Therapy completed

## 2023-10-24 ENCOUNTER — OFFICE VISIT (OUTPATIENT)
Dept: FAMILY MEDICINE CLINIC | Facility: CLINIC | Age: 63
End: 2023-10-24
Payer: COMMERCIAL

## 2023-10-24 VITALS
BODY MASS INDEX: 29.03 KG/M2 | WEIGHT: 185 LBS | SYSTOLIC BLOOD PRESSURE: 108 MMHG | TEMPERATURE: 98.1 F | HEART RATE: 91 BPM | DIASTOLIC BLOOD PRESSURE: 77 MMHG | HEIGHT: 67 IN | OXYGEN SATURATION: 98 %

## 2023-10-24 DIAGNOSIS — J06.9 URI WITH COUGH AND CONGESTION: Primary | ICD-10-CM

## 2023-10-24 DIAGNOSIS — R59.0 LYMPHADENOPATHY OF HEAD AND NECK REGION: ICD-10-CM

## 2023-10-24 DIAGNOSIS — H10.33 ACUTE CONJUNCTIVITIS OF BOTH EYES, UNSPECIFIED ACUTE CONJUNCTIVITIS TYPE: ICD-10-CM

## 2023-10-24 PROBLEM — E11.65 TYPE 2 DIABETES MELLITUS WITH HYPERGLYCEMIA: Status: ACTIVE | Noted: 2019-02-21

## 2023-10-24 PROCEDURE — 99213 OFFICE O/P EST LOW 20 MIN: CPT | Performed by: NURSE PRACTITIONER

## 2023-10-24 RX ORDER — AMOXICILLIN AND CLAVULANATE POTASSIUM 875; 125 MG/1; MG/1
1 TABLET, FILM COATED ORAL 2 TIMES DAILY
Qty: 14 TABLET | Refills: 0 | Status: SHIPPED | OUTPATIENT
Start: 2023-10-24 | End: 2023-10-31

## 2023-10-24 RX ORDER — BROMPHENIRAMINE MALEATE, PSEUDOEPHEDRINE HYDROCHLORIDE, AND DEXTROMETHORPHAN HYDROBROMIDE 2; 30; 10 MG/5ML; MG/5ML; MG/5ML
10 SYRUP ORAL 4 TIMES DAILY PRN
Qty: 240 ML | Refills: 0 | Status: SHIPPED | OUTPATIENT
Start: 2023-10-24

## 2023-10-24 RX ORDER — POLYMYXIN B SULFATE AND TRIMETHOPRIM 1; 10000 MG/ML; [USP'U]/ML
1 SOLUTION OPHTHALMIC EVERY 4 HOURS
Qty: 10 ML | Refills: 0 | Status: SHIPPED | OUTPATIENT
Start: 2023-10-24 | End: 2023-11-08

## 2023-10-24 NOTE — PROGRESS NOTES
"Chief Complaint  Nasal Congestion, Conjunctivitis, Allergies, Headache, and Cough (Dry cough )    Subjective      Bruno Morton is a 63 year old male that presents to [facility] with c/o sinus congestion, headache, sore throat, cough, ear pain/pressure and eye drainage with matting. Symptoms started about 1 week ago. Cough is non-productive. He denies fever, body aches or chills. He has tried tessalon perles without relief and taken Ibuprofen a couple of times. No known sick contacts or exposures. He works from home.     History of Present Illness    Current Outpatient Medications   Medication Instructions    amoxicillin-clavulanate (AUGMENTIN) 875-125 MG per tablet 1 tablet, Oral, 2 Times Daily    atorvastatin (LIPITOR) 20 mg, Oral, Daily    brompheniramine-pseudoephedrine-DM 30-2-10 MG/5ML syrup 10 mL, Oral, 4 Times Daily PRN    FreeStyle Unistick II Lancets misc use as directed 3 times weekly    glucose blood (FreeStyle Precision Lc Test) test strip test blood sugar daily    Jardiance 10 mg, Oral, Every Morning    levothyroxine (SYNTHROID, LEVOTHROID) 50 mcg, Oral, Daily - RT    metFORMIN ER (GLUCOPHAGE-XR) 500 MG 24 hr tablet Take 4 tablets (2000mg) by mouth once daily with the evening meal    polyethylene glycol (MIRALAX) 17 g    trimethoprim-polymyxin b (Polytrim) 32480-6.1 UNIT/ML-% ophthalmic solution 1 drop, Both Eyes, Every 4 Hours    valsartan-hydrochlorothiazide (DIOVAN-HCT) 160-12.5 MG per tablet 1 tablet, Oral, Daily       The following portions of the patient's history were reviewed and updated as appropriate: allergies, current medications, past family history, past medical history, past social history, past surgical history, and problem list.    Objective   Vital Signs:   /77 (BP Location: Right arm, Patient Position: Sitting)   Pulse 91   Temp 98.1 °F (36.7 °C) (Temporal)   Ht 170.2 cm (67\")   Wt 83.9 kg (185 lb)   SpO2 98%   BMI 28.98 kg/m²     Wt " Readings from Last 3 Encounters:   10/24/23 83.9 kg (185 lb)   10/09/23 84.9 kg (187 lb 3.2 oz)   07/25/23 83.9 kg (185 lb)     BP Readings from Last 3 Encounters:   10/24/23 108/77   10/09/23 130/83   07/25/23 120/70     Physical Exam  Vitals reviewed.   Constitutional:       Appearance: Normal appearance. He is well-developed. He is ill-appearing.   HENT:      Head: Normocephalic and atraumatic.      Right Ear: Tympanic membrane, ear canal and external ear normal.      Left Ear: Ear canal and external ear normal. Tympanic membrane is injected. Tympanic membrane is not erythematous or bulging.      Nose: Congestion present.      Right Sinus: Maxillary sinus tenderness and frontal sinus tenderness present.      Left Sinus: Maxillary sinus tenderness and frontal sinus tenderness present.      Mouth/Throat:      Lips: Pink.      Mouth: Mucous membranes are moist.      Pharynx: Uvula midline. No oropharyngeal exudate or posterior oropharyngeal erythema.   Eyes:      General: Lids are normal.         Right eye: Discharge present.         Left eye: Discharge present.     Conjunctiva/sclera: Conjunctivae normal.      Right eye: Right conjunctiva is injected.      Left eye: Left conjunctiva is injected.      Pupils: Pupils are equal, round, and reactive to light.      Comments: Clear discharge from bilateral eyes   Cardiovascular:      Rate and Rhythm: Normal rate and regular rhythm.      Heart sounds: Normal heart sounds. No murmur heard.  Pulmonary:      Effort: Pulmonary effort is normal.      Breath sounds: Normal breath sounds. No wheezing.   Musculoskeletal:      Cervical back: Neck supple.   Lymphadenopathy:      Cervical: Cervical adenopathy present.      Right cervical: Superficial cervical adenopathy present.      Left cervical: Superficial cervical adenopathy present.   Skin:     General: Skin is warm and dry.   Neurological:      Mental Status: He is alert and oriented to person, place, and time.   Psychiatric:          Mood and Affect: Affect normal.        Result Review :  The following data was reviewed by: DOLLY Faulkner on 10/24/2023:          Lab Results (last 72 hours)       ** No results found for the last 72 hours. **             No Images in the past 120 days found..    Lab Results   Component Value Date    SARSANTIGEN Not Detected 09/19/2022    COVID19 NOT DETECTED 11/02/2020    BILIRUBINUR Negative 06/06/2023    POCGLU 102 (H) 11/06/2020       Procedures        Assessment and Plan   Diagnoses and all orders for this visit:    1. URI with cough and congestion (Primary)  -     amoxicillin-clavulanate (AUGMENTIN) 875-125 MG per tablet; Take 1 tablet by mouth 2 (Two) Times a Day for 7 days.  Dispense: 14 tablet; Refill: 0  -     brompheniramine-pseudoephedrine-DM 30-2-10 MG/5ML syrup; Take 10 mL by mouth 4 (Four) Times a Day As Needed for Cough or Congestion.  Dispense: 240 mL; Refill: 0    2. Acute conjunctivitis of both eyes, unspecified acute conjunctivitis type  -     trimethoprim-polymyxin b (Polytrim) 08483-3.1 UNIT/ML-% ophthalmic solution; Administer 1 drop to both eyes Every 4 (Four) Hours for 10 days.  Dispense: 10 mL; Refill: 0    3. Lymphadenopathy of head and neck region  -     amoxicillin-clavulanate (AUGMENTIN) 875-125 MG per tablet; Take 1 tablet by mouth 2 (Two) Times a Day for 7 days.  Dispense: 14 tablet; Refill: 0                  There are no discontinued medications.       Follow Up   No follow-ups on file.  Patient was given instructions and counseling regarding his condition or for health maintenance advice. Please see specific information pulled into the AVS if appropriate.     Supportive care with rest, plenty of fluids, tylenol/ibuprofen for pain and/or fever as needed per label instructions.  You may find a cool-mist humidifier helpful as well as throat lozenges, Chloraseptic spray and warm salt water gargles.  Should you develop shortness of breath, chest pain or worsening of  symptoms please go to the ER.      Chary Daugherty, APRN  10/24/23  08:13 EDT

## 2023-11-15 ENCOUNTER — OFFICE VISIT (OUTPATIENT)
Dept: FAMILY MEDICINE CLINIC | Facility: CLINIC | Age: 63
End: 2023-11-15
Payer: COMMERCIAL

## 2023-11-15 VITALS
OXYGEN SATURATION: 97 % | BODY MASS INDEX: 28.88 KG/M2 | WEIGHT: 184 LBS | HEART RATE: 102 BPM | TEMPERATURE: 98.2 F | SYSTOLIC BLOOD PRESSURE: 108 MMHG | HEIGHT: 67 IN | DIASTOLIC BLOOD PRESSURE: 72 MMHG

## 2023-11-15 DIAGNOSIS — U07.1 COVID-19: Primary | ICD-10-CM

## 2023-11-15 PROCEDURE — 99213 OFFICE O/P EST LOW 20 MIN: CPT | Performed by: NURSE PRACTITIONER

## 2023-11-15 NOTE — PATIENT INSTRUCTIONS
Supportive care with rest, plenty of fluids, tylenol/ibuprofen for pain and/or fever as needed per label instructions.  Should you develop shortness of breath, chest pain or worsening of symptoms please go to the ER.

## 2023-11-15 NOTE — PROGRESS NOTES
"Chief Complaint  Cough and Nasal Congestion (Pt was covid tested at [facility] yesterday 11/14/23 - would like medication to help with symptoms )    Subjective      Bruno Morton is a 63 year old male that presents to [facility] with c/o covid. He has been having head and ear pressure, coughing and dizziness that started around Saturday (4 days ago). He went to [facility] yesterday where he had a positive covid test. He denies fever or chills. He was previously sick with similar symptoms 2 weeks ago. Those symptoms improved but did not resolve. No known sick contacts or exposures.     History of Present Illness    Current Outpatient Medications   Medication Instructions    atorvastatin (LIPITOR) 20 mg, Oral, Daily    brompheniramine-pseudoephedrine-DM 30-2-10 MG/5ML syrup 10 mL, Oral, 4 Times Daily PRN    FreeStyle Unistick II Lancets misc use as directed 3 times weekly    glucose blood (FreeStyle Precision Lc Test) test strip test blood sugar daily    Jardiance 10 mg, Oral, Every Morning    levothyroxine (SYNTHROID, LEVOTHROID) 50 mcg, Oral, Daily - RT    metFORMIN ER (GLUCOPHAGE-XR) 500 MG 24 hr tablet Take 4 tablets (2000mg) by mouth once daily with the evening meal    Nirmatrelvir&Ritonavir 300/100 (PAXLOVID) 20 x 150 MG & 10 x 100MG tablet therapy pack tablet 3 tablets, Oral, 2 Times Daily    polyethylene glycol (MIRALAX) 17 g    Status COVID-19/Flu A&B kit TEST AS DIRECTED TODAY    valsartan-hydrochlorothiazide (DIOVAN-HCT) 160-12.5 MG per tablet 1 tablet, Oral, Daily       The following portions of the patient's history were reviewed and updated as appropriate: allergies, current medications, past family history, past medical history, past social history, past surgical history, and problem list.    Objective   Vital Signs:   /72 (BP Location: Left arm, Patient Position: Sitting)   Pulse 102   Temp 98.2 °F (36.8 °C) (Temporal)   Ht 170.2 cm (67\")   Wt 83.5 kg (184 " lb)   SpO2 97%   BMI 28.82 kg/m²     Wt Readings from Last 3 Encounters:   11/15/23 83.5 kg (184 lb)   10/24/23 83.9 kg (185 lb)   10/09/23 84.9 kg (187 lb 3.2 oz)     BP Readings from Last 3 Encounters:   11/15/23 108/72   10/24/23 108/77   10/09/23 130/83     Physical Exam  Vitals reviewed.   Constitutional:       Appearance: Normal appearance. He is well-developed. He is not ill-appearing.   HENT:      Head: Normocephalic and atraumatic.      Right Ear: Tympanic membrane, ear canal and external ear normal.      Left Ear: Tympanic membrane, ear canal and external ear normal.      Nose: Nose normal.      Mouth/Throat:      Mouth: Mucous membranes are moist.      Pharynx: No oropharyngeal exudate or posterior oropharyngeal erythema.   Eyes:      Conjunctiva/sclera: Conjunctivae normal.      Pupils: Pupils are equal, round, and reactive to light.   Cardiovascular:      Rate and Rhythm: Normal rate and regular rhythm.      Heart sounds: Normal heart sounds.   Pulmonary:      Effort: Pulmonary effort is normal.      Breath sounds: Normal breath sounds. No wheezing.   Skin:     General: Skin is warm and dry.   Neurological:      Mental Status: He is alert and oriented to person, place, and time.   Psychiatric:         Mood and Affect: Mood and affect normal.         Behavior: Behavior normal.          Result Review :  The following data was reviewed by: DOLLY Faulkner on 11/15/2023:            Lab Results (last 72 hours)       ** No results found for the last 72 hours. **             No Images in the past 120 days found..    Lab Results   Component Value Date    SARSANTIGEN Not Detected 09/19/2022    COVID19 NOT DETECTED 11/02/2020    BILIRUBINUR Negative 06/06/2023    POCGLU 102 (H) 11/06/2020       Procedures        Assessment and Plan   Diagnoses and all orders for this visit:    1. COVID-19 (Primary)  -     Nirmatrelvir&Ritonavir 300/100 (PAXLOVID) 20 x 150 MG & 10 x 100MG tablet therapy pack tablet;  Take 3 tablets by mouth 2 (Two) Times a Day for 5 days.  Dispense: 30 tablet; Refill: 0         Advised to hold statin while taking this medication. Must be started today as it is day 5 of symptoms.          There are no discontinued medications.       Follow Up   No follow-ups on file.  Patient was given instructions and counseling regarding his condition or for health maintenance advice. Please see specific information pulled into the AVS if appropriate.     Supportive care with rest, plenty of fluids, tylenol/ibuprofen for pain and/or fever as needed per label instructions.  Should you develop shortness of breath, chest pain or worsening of symptoms please go to the ER.  Chary Daugherty, DOLLY  11/15/23  11:54 EST

## 2023-12-12 ENCOUNTER — CLINICAL SUPPORT (OUTPATIENT)
Dept: FAMILY MEDICINE CLINIC | Facility: CLINIC | Age: 63
End: 2023-12-12
Payer: COMMERCIAL

## 2023-12-12 DIAGNOSIS — E03.9 HYPOTHYROIDISM, UNSPECIFIED TYPE: ICD-10-CM

## 2023-12-12 DIAGNOSIS — E11.9 TYPE 2 DIABETES MELLITUS WITHOUT COMPLICATION, WITHOUT LONG-TERM CURRENT USE OF INSULIN: ICD-10-CM

## 2023-12-12 LAB
HBA1C MFR BLD: 6.2 % (ref 4.8–5.6)
TSH SERPL DL<=0.05 MIU/L-ACNC: 4.09 UIU/ML (ref 0.27–4.2)

## 2023-12-12 PROCEDURE — 83036 HEMOGLOBIN GLYCOSYLATED A1C: CPT | Performed by: STUDENT IN AN ORGANIZED HEALTH CARE EDUCATION/TRAINING PROGRAM

## 2023-12-12 PROCEDURE — 84443 ASSAY THYROID STIM HORMONE: CPT | Performed by: STUDENT IN AN ORGANIZED HEALTH CARE EDUCATION/TRAINING PROGRAM

## 2024-01-17 ENCOUNTER — OFFICE VISIT (OUTPATIENT)
Dept: FAMILY MEDICINE CLINIC | Facility: CLINIC | Age: 64
End: 2024-01-17
Payer: COMMERCIAL

## 2024-01-17 VITALS
SYSTOLIC BLOOD PRESSURE: 130 MMHG | DIASTOLIC BLOOD PRESSURE: 74 MMHG | OXYGEN SATURATION: 96 % | BODY MASS INDEX: 29.35 KG/M2 | TEMPERATURE: 97.9 F | WEIGHT: 187 LBS | HEIGHT: 67 IN | HEART RATE: 89 BPM

## 2024-01-17 DIAGNOSIS — I10 HYPERTENSION, UNSPECIFIED TYPE: ICD-10-CM

## 2024-01-17 DIAGNOSIS — E03.9 HYPOTHYROIDISM, UNSPECIFIED TYPE: ICD-10-CM

## 2024-01-17 DIAGNOSIS — E78.2 MIXED HYPERLIPIDEMIA: ICD-10-CM

## 2024-01-17 DIAGNOSIS — E78.5 HYPERLIPIDEMIA, UNSPECIFIED HYPERLIPIDEMIA TYPE: ICD-10-CM

## 2024-01-17 DIAGNOSIS — E03.9 ACQUIRED HYPOTHYROIDISM: ICD-10-CM

## 2024-01-17 DIAGNOSIS — I10 HYPERTENSION, ESSENTIAL: ICD-10-CM

## 2024-01-17 DIAGNOSIS — E11.65 TYPE 2 DIABETES MELLITUS WITH HYPERGLYCEMIA, WITHOUT LONG-TERM CURRENT USE OF INSULIN: Primary | ICD-10-CM

## 2024-01-17 LAB
ALBUMIN SERPL-MCNC: 4.6 G/DL (ref 3.5–5.2)
ALBUMIN UR-MCNC: 1.4 MG/DL
ALBUMIN/GLOB SERPL: 1.8 G/DL
ALP SERPL-CCNC: 56 U/L (ref 39–117)
ALT SERPL W P-5'-P-CCNC: 25 U/L (ref 1–41)
ANION GAP SERPL CALCULATED.3IONS-SCNC: 13 MMOL/L (ref 5–15)
AST SERPL-CCNC: 15 U/L (ref 1–40)
BASOPHILS # BLD AUTO: 0.07 10*3/MM3 (ref 0–0.2)
BASOPHILS NFR BLD AUTO: 1 % (ref 0–1.5)
BILIRUB SERPL-MCNC: 0.7 MG/DL (ref 0–1.2)
BUN SERPL-MCNC: 15 MG/DL (ref 8–23)
BUN/CREAT SERPL: 14.2 (ref 7–25)
CALCIUM SPEC-SCNC: 9.6 MG/DL (ref 8.6–10.5)
CHLORIDE SERPL-SCNC: 101 MMOL/L (ref 98–107)
CHOLEST SERPL-MCNC: 152 MG/DL (ref 0–200)
CO2 SERPL-SCNC: 26 MMOL/L (ref 22–29)
CREAT SERPL-MCNC: 1.06 MG/DL (ref 0.76–1.27)
DEPRECATED RDW RBC AUTO: 43.9 FL (ref 37–54)
EGFRCR SERPLBLD CKD-EPI 2021: 77.9 ML/MIN/1.73
EOSINOPHIL # BLD AUTO: 0.19 10*3/MM3 (ref 0–0.4)
EOSINOPHIL NFR BLD AUTO: 2.6 % (ref 0.3–6.2)
ERYTHROCYTE [DISTWIDTH] IN BLOOD BY AUTOMATED COUNT: 13.5 % (ref 12.3–15.4)
GLOBULIN UR ELPH-MCNC: 2.6 GM/DL
GLUCOSE SERPL-MCNC: 120 MG/DL (ref 65–99)
HBA1C MFR BLD: 6.3 % (ref 4.8–5.6)
HCT VFR BLD AUTO: 47.2 % (ref 37.5–51)
HDLC SERPL-MCNC: 29 MG/DL (ref 40–60)
HGB BLD-MCNC: 16.3 G/DL (ref 13–17.7)
IMM GRANULOCYTES # BLD AUTO: 0.04 10*3/MM3 (ref 0–0.05)
IMM GRANULOCYTES NFR BLD AUTO: 0.6 % (ref 0–0.5)
LDLC SERPL CALC-MCNC: 88 MG/DL (ref 0–100)
LDLC/HDLC SERPL: 2.83 {RATIO}
LYMPHOCYTES # BLD AUTO: 1.86 10*3/MM3 (ref 0.7–3.1)
LYMPHOCYTES NFR BLD AUTO: 25.7 % (ref 19.6–45.3)
MCH RBC QN AUTO: 30.8 PG (ref 26.6–33)
MCHC RBC AUTO-ENTMCNC: 34.5 G/DL (ref 31.5–35.7)
MCV RBC AUTO: 89.2 FL (ref 79–97)
MONOCYTES # BLD AUTO: 0.58 10*3/MM3 (ref 0.1–0.9)
MONOCYTES NFR BLD AUTO: 8 % (ref 5–12)
NEUTROPHILS NFR BLD AUTO: 4.5 10*3/MM3 (ref 1.7–7)
NEUTROPHILS NFR BLD AUTO: 62.1 % (ref 42.7–76)
NRBC BLD AUTO-RTO: 0 /100 WBC (ref 0–0.2)
PLATELET # BLD AUTO: 281 10*3/MM3 (ref 140–450)
PMV BLD AUTO: 10.3 FL (ref 6–12)
POTASSIUM SERPL-SCNC: 4.2 MMOL/L (ref 3.5–5.2)
PROT SERPL-MCNC: 7.2 G/DL (ref 6–8.5)
RBC # BLD AUTO: 5.29 10*6/MM3 (ref 4.14–5.8)
SODIUM SERPL-SCNC: 140 MMOL/L (ref 136–145)
TRIGL SERPL-MCNC: 205 MG/DL (ref 0–150)
TSH SERPL DL<=0.05 MIU/L-ACNC: 3.1 UIU/ML (ref 0.27–4.2)
VLDLC SERPL-MCNC: 35 MG/DL (ref 5–40)
WBC NRBC COR # BLD AUTO: 7.24 10*3/MM3 (ref 3.4–10.8)

## 2024-01-17 PROCEDURE — 80050 GENERAL HEALTH PANEL: CPT | Performed by: STUDENT IN AN ORGANIZED HEALTH CARE EDUCATION/TRAINING PROGRAM

## 2024-01-17 PROCEDURE — 83036 HEMOGLOBIN GLYCOSYLATED A1C: CPT | Performed by: STUDENT IN AN ORGANIZED HEALTH CARE EDUCATION/TRAINING PROGRAM

## 2024-01-17 PROCEDURE — 80061 LIPID PANEL: CPT | Performed by: STUDENT IN AN ORGANIZED HEALTH CARE EDUCATION/TRAINING PROGRAM

## 2024-01-17 PROCEDURE — 82043 UR ALBUMIN QUANTITATIVE: CPT | Performed by: STUDENT IN AN ORGANIZED HEALTH CARE EDUCATION/TRAINING PROGRAM

## 2024-01-17 RX ORDER — METFORMIN HYDROCHLORIDE 500 MG/1
2000 TABLET, EXTENDED RELEASE ORAL
Qty: 360 TABLET | Refills: 3 | Status: SHIPPED | OUTPATIENT
Start: 2024-01-17

## 2024-01-17 RX ORDER — LEVOTHYROXINE SODIUM 0.05 MG/1
50 TABLET ORAL
Qty: 90 TABLET | Refills: 3 | Status: SHIPPED | OUTPATIENT
Start: 2024-01-17

## 2024-01-17 RX ORDER — VALSARTAN AND HYDROCHLOROTHIAZIDE 160; 12.5 MG/1; MG/1
1 TABLET, FILM COATED ORAL DAILY
Qty: 270 TABLET | Refills: 3 | Status: SHIPPED | OUTPATIENT
Start: 2024-01-17

## 2024-01-17 RX ORDER — LANCETS 21 GAUGE
EACH MISCELLANEOUS
Qty: 100 EACH | Refills: 0 | Status: SHIPPED | OUTPATIENT
Start: 2024-01-17

## 2024-01-17 RX ORDER — ATORVASTATIN CALCIUM 20 MG/1
20 TABLET, FILM COATED ORAL DAILY
Qty: 90 TABLET | Refills: 3 | Status: SHIPPED | OUTPATIENT
Start: 2024-01-17

## 2024-01-17 NOTE — PROGRESS NOTES
"Chief Complaint  Med Management (General checkup and medication management) and Diabetes (6 month follow up diabetes)    Subjective      History of Present Illness  Bruno Morton is a 63 y.o. male who presents to [facility] with a past medical history of  Past Medical History:   Diagnosis Date    Allergic rhinitis     Colitis, ulcerative 1980    Diabetes mellitus, type 2 02/21/2017    Eczematous dermatitis 03/16/2020    Essential hypertension     Hyperlipidemia     Hypothyroid 02/21/2019    Medication management 02/06/2017     Patient comes for diabetes, HLD, and HTN management.     Blood work today. If good. Follow up in 6 months.     HM  Colonoscopy due 2025     Objective   Vital Signs:   Vitals:    01/17/24 0719   BP: 130/74   Pulse: 89   Temp: 97.9 °F (36.6 °C)   SpO2: 96%   Weight: 84.8 kg (187 lb)   Height: 170.2 cm (67\")     Body mass index is 29.29 kg/m².    Wt Readings from Last 3 Encounters:   01/17/24 84.8 kg (187 lb)   11/15/23 83.5 kg (184 lb)   10/24/23 83.9 kg (185 lb)     BP Readings from Last 3 Encounters:   01/17/24 130/74   11/15/23 108/72   10/24/23 108/77       Health Maintenance   Topic Date Due    TDAP/TD VACCINES (1 - Tdap) Never done    ZOSTER VACCINE (1 of 2) Never done    Hepatitis B (1 of 3 - Risk 3-dose series) Never done    DIABETIC FOOT EXAM  05/12/2022    COVID-19 Vaccine (6 - 2023-24 season) 09/01/2023    URINE MICROALBUMIN  11/22/2023    DIABETIC EYE EXAM  01/24/2024    ANNUAL PHYSICAL  06/06/2024    LIPID PANEL  06/06/2024    HEMOGLOBIN A1C  06/12/2024    BMI FOLLOWUP  10/09/2024    COLORECTAL CANCER SCREENING  11/06/2025    HEPATITIS C SCREENING  Completed    INFLUENZA VACCINE  Completed    Pneumococcal Vaccine 65+  Completed       Physical Exam  Vitals reviewed.   HENT:      Head: Normocephalic.      Mouth/Throat:      Mouth: Mucous membranes are moist.   Eyes:      Pupils: Pupils are equal, round, and reactive to light.   Cardiovascular:      " Rate and Rhythm: Normal rate.   Abdominal:      General: Abdomen is flat.   Musculoskeletal:         General: Normal range of motion.      Cervical back: Normal range of motion.   Skin:     General: Skin is warm.      Capillary Refill: Capillary refill takes less than 2 seconds.   Neurological:      Mental Status: He is alert.            Result Review :  The following data was reviewed by: Spencer Roberts MD on 01/17/2024:           Assessment and Plan   Diagnoses and all orders for this visit:    1. Type 2 diabetes mellitus with hyperglycemia, without long-term current use of insulin (Primary)  -     Hemoglobin A1c  -     MicroAlbumin, Urine, Random - Urine, Clean Catch    2. Hypertension, unspecified type  -     Comprehensive Metabolic Panel  -     CBC & Differential    3. Hypothyroidism, unspecified type  -     TSH    4. Hyperlipidemia, unspecified hyperlipidemia type  -     Lipid Panel                  FOLLOW UP  No follow-ups on file.  Patient was given instructions and counseling regarding his condition or for health maintenance advice. Please see specific information pulled into the AVS if appropriate.       Spencer Roberts MD  01/17/24  07:41 EST    CURRENT & DISCONTINUED MEDICATIONS  Current Outpatient Medications   Medication Instructions    atorvastatin (LIPITOR) 20 mg, Oral, Daily    FreeStyle Unistick II Lancets misc use as directed 3 times weekly    glucose blood (FreeStyle Precision Lc Test) test strip test blood sugar daily    Jardiance 10 mg, Oral, Every Morning    levothyroxine (SYNTHROID, LEVOTHROID) 50 mcg, Oral, Daily - RT    metFORMIN ER (GLUCOPHAGE-XR) 500 MG 24 hr tablet Take 4 tablets (2000mg) by mouth once daily with the evening meal    polyethylene glycol (MIRALAX) 17 g    valsartan-hydrochlorothiazide (DIOVAN-HCT) 160-12.5 MG per tablet 1 tablet, Oral, Daily       Medications Discontinued During This Encounter   Medication Reason    Status COVID-19/Flu A&B kit  *Therapy completed    brompheniramine-pseudoephedrine-DM 30-2-10 MG/5ML syrup *Therapy completed

## 2024-06-26 ENCOUNTER — OFFICE VISIT (OUTPATIENT)
Dept: FAMILY MEDICINE CLINIC | Facility: CLINIC | Age: 64
End: 2024-06-26
Payer: COMMERCIAL

## 2024-06-26 VITALS
BODY MASS INDEX: 28.97 KG/M2 | SYSTOLIC BLOOD PRESSURE: 110 MMHG | WEIGHT: 184.6 LBS | DIASTOLIC BLOOD PRESSURE: 75 MMHG | HEART RATE: 81 BPM | TEMPERATURE: 98.1 F | OXYGEN SATURATION: 97 % | HEIGHT: 67 IN

## 2024-06-26 DIAGNOSIS — E78.2 MIXED HYPERLIPIDEMIA: ICD-10-CM

## 2024-06-26 DIAGNOSIS — E11.65 TYPE 2 DIABETES MELLITUS WITH HYPERGLYCEMIA, WITHOUT LONG-TERM CURRENT USE OF INSULIN: ICD-10-CM

## 2024-06-26 DIAGNOSIS — I10 HYPERTENSION, ESSENTIAL: ICD-10-CM

## 2024-06-26 DIAGNOSIS — E03.9 ACQUIRED HYPOTHYROIDISM: ICD-10-CM

## 2024-06-26 DIAGNOSIS — K64.9 HEMORRHOIDS, UNSPECIFIED HEMORRHOID TYPE: Primary | ICD-10-CM

## 2024-06-26 LAB — HBA1C MFR BLD: 6.3 % (ref 4.8–5.6)

## 2024-06-26 PROCEDURE — 83036 HEMOGLOBIN GLYCOSYLATED A1C: CPT | Performed by: STUDENT IN AN ORGANIZED HEALTH CARE EDUCATION/TRAINING PROGRAM

## 2024-06-26 PROCEDURE — 99214 OFFICE O/P EST MOD 30 MIN: CPT | Performed by: STUDENT IN AN ORGANIZED HEALTH CARE EDUCATION/TRAINING PROGRAM

## 2024-06-26 RX ORDER — LEVOTHYROXINE SODIUM 0.05 MG/1
50 TABLET ORAL
Qty: 90 TABLET | Refills: 3 | Status: SHIPPED | OUTPATIENT
Start: 2024-06-26

## 2024-06-26 RX ORDER — ATORVASTATIN CALCIUM 20 MG/1
20 TABLET, FILM COATED ORAL DAILY
Qty: 90 TABLET | Refills: 3 | Status: SHIPPED | OUTPATIENT
Start: 2024-06-26

## 2024-06-26 RX ORDER — METFORMIN HYDROCHLORIDE 500 MG/1
2000 TABLET, EXTENDED RELEASE ORAL
Qty: 360 TABLET | Refills: 3 | Status: SHIPPED | OUTPATIENT
Start: 2024-06-26

## 2024-06-26 RX ORDER — VALSARTAN AND HYDROCHLOROTHIAZIDE 160; 12.5 MG/1; MG/1
1 TABLET, FILM COATED ORAL DAILY
Qty: 270 TABLET | Refills: 3 | Status: SHIPPED | OUTPATIENT
Start: 2024-06-26

## 2024-06-26 NOTE — PROGRESS NOTES
"Chief Complaint  Diabetes (Follow up ), Hemorrhoids (For two days ), and Shoulder Pain (Left want to talk about shots pain going on for a few years )    Subjective      Bruno Morton is a 64 y.o. male who presents to [facility]       Patient comes for diabetes control - check labs today.      Hemorrhoids - referral to colorectal.    HTN - controlled.     Hypothyroidism - cont levothyroxine.       Objective   Vital Signs:   Vitals:    06/26/24 0653   BP: 110/75   Pulse: 81   Temp: 98.1 °F (36.7 °C)   TempSrc: Temporal   SpO2: 97%   Weight: 83.7 kg (184 lb 9.6 oz)   Height: 170.2 cm (67.01\")     Body mass index is 28.91 kg/m².    Wt Readings from Last 3 Encounters:   06/26/24 83.7 kg (184 lb 9.6 oz)   01/17/24 84.8 kg (187 lb)   11/15/23 83.5 kg (184 lb)     BP Readings from Last 3 Encounters:   06/26/24 110/75   01/17/24 130/74   11/15/23 108/72       Health Maintenance   Topic Date Due    TDAP/TD VACCINES (1 - Tdap) Never done    ZOSTER VACCINE (1 of 2) Never done    Hepatitis B (1 of 3 - Risk 3-dose series) Never done    DIABETIC FOOT EXAM  05/12/2022    DIABETIC EYE EXAM  01/24/2024    ANNUAL PHYSICAL  06/06/2024    HEMOGLOBIN A1C  07/17/2024    COVID-19 Vaccine (6 - 2023-24 season) 09/15/2024 (Originally 9/1/2023)    RSV Vaccine - Adults (1 - 1-dose 60+ series) 06/26/2025 (Originally 5/8/2018)    INFLUENZA VACCINE  08/01/2024    BMI FOLLOWUP  10/09/2024    LIPID PANEL  01/17/2025    URINE MICROALBUMIN  01/17/2025    COLORECTAL CANCER SCREENING  11/06/2025    HEPATITIS C SCREENING  Completed    Pneumococcal Vaccine 65+  Completed       Physical Exam  Vitals reviewed.   HENT:      Head: Normocephalic.      Mouth/Throat:      Mouth: Mucous membranes are moist.   Eyes:      Pupils: Pupils are equal, round, and reactive to light.   Cardiovascular:      Rate and Rhythm: Normal rate.   Abdominal:      General: Abdomen is flat.   Musculoskeletal:         General: Normal range of motion. "      Cervical back: Normal range of motion.   Skin:     General: Skin is warm.      Capillary Refill: Capillary refill takes less than 2 seconds.   Neurological:      Mental Status: He is alert.            Assessment & Plan  Hemorrhoids, unspecified hemorrhoid type    Mixed hyperlipidemia   Lipid abnormalities are stable    Plan:  Continue same medication/s without change.      Discussed medication dosage, use, side effects, and goals of treatment in detail.    Counseled patient on lifestyle modifications to help control hyperlipidemia.     Patient Treatment Goals:   LDL goal is less than 70    Followup in 6 months.  Type 2 diabetes mellitus with hyperglycemia, without long-term current use of insulin  Diabetes is stable.   Continue current treatment regimen.  Diabetes will be reassessed in 6 months  Hypertension, essential  Hypertension is stable and controlled  Continue current treatment regimen.  Blood pressure will be reassessed in 6 months.  Acquired hypothyroidism      Orders Placed This Encounter   Procedures    Ambulatory Referral to Colorectal Surgery     New Medications Ordered This Visit   Medications    Lidocaine-nitroglycerin 2.5-1 % ointment     Sig: Apply 1 Application topically 2 (Two) Times a Day.     Dispense:  1 each     Refill:  2    atorvastatin (LIPITOR) 20 MG tablet     Sig: Take 1 tablet by mouth Daily.     Dispense:  90 tablet     Refill:  3    metFORMIN ER (GLUCOPHAGE-XR) 500 MG 24 hr tablet     Sig: Take 4 tablets (2000mg) by mouth once daily with the evening meal     Dispense:  360 tablet     Refill:  3    empagliflozin (JARDIANCE) 10 MG tablet tablet     Sig: Take 1 tablet by mouth Every Morning.     Dispense:  90 tablet     Refill:  3    valsartan-hydrochlorothiazide (DIOVAN-HCT) 160-12.5 MG per tablet     Sig: Take 1 tablet by mouth Daily.     Dispense:  270 tablet     Refill:  3    levothyroxine (SYNTHROID, LEVOTHROID) 50 MCG tablet     Sig: Take 1 tablet by mouth Daily.     Dispense:   90 tablet     Refill:  3                  I spent 20 minutes caring for Bruno on this date of service. This time includes time spent by me in the following activities:preparing for the visit, reviewing tests, obtaining and/or reviewing a separately obtained history, performing a medically appropriate examination and/or evaluation , counseling and educating the patient/family/caregiver, ordering medications, tests, or procedures, referring and communicating with other health care professionals , documenting information in the medical record, independently interpreting results and communicating that information with the patient/family/caregiver, and care coordination  FOLLOW UP  Return in about 6 months (around 12/26/2024).  Patient was given instructions and counseling regarding his condition or for health maintenance advice. Please see specific information pulled into the AVS if appropriate.       Spencer Roberts MD  06/26/24  07:50 EDT    CURRENT & DISCONTINUED MEDICATIONS  Current Outpatient Medications   Medication Instructions    atorvastatin (LIPITOR) 20 mg, Oral, Daily    empagliflozin (JARDIANCE) 10 mg, Oral, Every Morning    FreeStyle Unistick II Lancets misc use as directed 3 times weekly    glucose blood (FreeStyle Precision Lc Test) test strip test blood sugar daily    levothyroxine (SYNTHROID, LEVOTHROID) 50 mcg, Oral, Daily - RT    Lidocaine-nitroglycerin 2.5-1 % ointment 1 Application, Apply externally, 2 Times Daily    metFORMIN ER (GLUCOPHAGE-XR) 500 MG 24 hr tablet Take 4 tablets (2000mg) by mouth once daily with the evening meal    polyethylene glycol (MIRALAX) 17 g    valsartan-hydrochlorothiazide (DIOVAN-HCT) 160-12.5 MG per tablet 1 tablet, Oral, Daily       Medications Discontinued During This Encounter   Medication Reason    metFORMIN ER (GLUCOPHAGE-XR) 500 MG 24 hr tablet Reorder    empagliflozin (JARDIANCE) 10 MG tablet tablet Reorder    atorvastatin (LIPITOR) 20 MG tablet  Reorder    valsartan-hydrochlorothiazide (DIOVAN-HCT) 160-12.5 MG per tablet Reorder    levothyroxine (SYNTHROID, LEVOTHROID) 50 MCG tablet Reorder

## 2024-06-27 ENCOUNTER — TELEPHONE (OUTPATIENT)
Dept: FAMILY MEDICINE CLINIC | Facility: CLINIC | Age: 64
End: 2024-06-27
Payer: COMMERCIAL

## 2024-06-27 NOTE — TELEPHONE ENCOUNTER
Caller: [facility] - [place], [place] - [address] - [phone]  - [phone] FX    Relationship to patient: Pharmacy      Patient is needing: NEEDING CLARIFICATION ON     Lidocaine-nitroglycerin 2.5-1 % ointment

## 2024-06-28 ENCOUNTER — TELEPHONE (OUTPATIENT)
Dept: FAMILY MEDICINE CLINIC | Facility: CLINIC | Age: 64
End: 2024-06-28
Payer: COMMERCIAL

## 2024-06-28 NOTE — TELEPHONE ENCOUNTER
Talked with pharmacy on the Lidocaine-nitroglycerin 2.5-1 % ointment got it all fixed called pt to let them know it was ready to be picked up

## 2024-07-02 ENCOUNTER — OFFICE VISIT (OUTPATIENT)
Dept: SURGERY | Facility: CLINIC | Age: 64
End: 2024-07-02
Payer: COMMERCIAL

## 2024-07-02 VITALS
HEIGHT: 67 IN | SYSTOLIC BLOOD PRESSURE: 130 MMHG | DIASTOLIC BLOOD PRESSURE: 72 MMHG | WEIGHT: 184.2 LBS | HEART RATE: 81 BPM | BODY MASS INDEX: 28.91 KG/M2

## 2024-07-02 DIAGNOSIS — K64.5 THROMBOSED EXTERNAL HEMORRHOID: Primary | ICD-10-CM

## 2024-07-03 NOTE — PROGRESS NOTES
General Surgery/Colorectal Surgery Note    Patient Name:  Bruno Morton  YOB: 1960  [id number]    Referring Provider: Spencer Dent,*      Patient Care Team:  Spencer Dent MD as PCP - General (Internal Medicine)    Chief complaint hemorrhoids    Subjective .     History of present illness:    History of symptomatic hemorrhoids with prolapse which will require manual reduction from time to time.  He will have swelling of his hemorrhoids.  He recently developed a painful lump near his anus which has since improved.  No bleeding.  No pain with bowel movements.  He is on and off the toilet quickly.  He uses MiraLAX for constipation.  No fiber use.  He has had a recent flareup of his hemorrhoids after diarrhea.  Colonoscopy 2020 with diverticulosis      History:  Past Medical History:   Diagnosis Date    Allergic rhinitis     Colitis, ulcerative 1980    Diabetes mellitus, type 2 02/21/2017    Eczematous dermatitis 03/16/2020    Essential hypertension     Hyperlipidemia     Hypothyroid 02/21/2019    Medication management 02/06/2017       Past Surgical History:   Procedure Laterality Date    COLONOSCOPY  2020 2014    ENDOSCOPY  2014       Family History   Problem Relation Age of Onset    Heart disease Father        Social History     Tobacco Use    Smoking status: Never    Smokeless tobacco: Never   Vaping Use    Vaping status: Never Used   Substance Use Topics    Alcohol use: Never    Drug use: Never       Review of Systems  All systems were reviewed and negative except for:   Review of Systems   Constitutional: Negative for chills, fever and unexpected weight loss.   HENT: Negative for congestion, nosebleeds and voice change.    Eyes: Negative for blurred vision, double vision and discharge.   Respiratory: Negative for apnea, chest tightness and shortness of breath.    Cardiovascular: Negative for chest pain and leg swelling.   Gastrointestinal:        See HPI   Endocrine: Negative  for cold intolerance and heat intolerance.   Genitourinary: Negative for dysuria, hematuria and urgency.   Musculoskeletal: Negative for back pain, joint swelling and neck pain.   Skin: Negative for color change and dry skin.   Neurological: Negative for dizziness and confusion.   Hematological: Negative for adenopathy.   Psychiatric/Behavioral: Negative for agitation and behavioral problems.     MEDS:  Prior to Admission medications    Medication Sig Start Date End Date Taking? Authorizing Provider   atorvastatin (LIPITOR) 20 MG tablet Take 1 tablet by mouth Daily. 6/26/24  Yes Spencer Dent MD   empagliflozin (JARDIANCE) 10 MG tablet tablet Take 1 tablet by mouth Every Morning. 6/26/24  Yes Spencer Dent MD   FreeStyle Unistick II Lancets misc use as directed 3 times weekly 1/17/24  Yes Spencer Dent MD   glucose blood (FreeStyle Precision Lc Test) test strip test blood sugar daily 1/28/21  Yes    levothyroxine (SYNTHROID, LEVOTHROID) 50 MCG tablet Take 1 tablet by mouth Daily. 6/26/24  Yes Spencer Dent MD   Lidocaine-nitroglycerin 2.5-1 % ointment Apply 1 Application topically 2 (Two) Times a Day. 6/27/24  Yes Spencer Dent MD   metFORMIN ER (GLUCOPHAGE-XR) 500 MG 24 hr tablet Take 4 tablets (2000mg) by mouth once daily with the evening meal 6/26/24  Yes Spencer Dent MD   polyethylene glycol (MiraLax) 17 GM/SCOOP powder 17 g.   Yes Provider, MD Zahida   valsartan-hydrochlorothiazide (DIOVAN-HCT) 160-12.5 MG per tablet Take 1 tablet by mouth Daily. 6/26/24  Yes Spencer Dent MD        Allergies:  Patient has no known allergies.    Objective     Vital Signs        Physical Exam:     General Appearance:    Alert, cooperative, in no acute distress   Head:    Normocephalic, without obvious abnormality, atraumatic   Eyes:          Conjunctivae and sclerae normal, no icterus,     Ears:    Ears appear intact with no abnormalities noted  "  Throat:   No oral lesions, no thrush, oral mucosa moist   Neck:   No adenopathy, supple, trachea midline, no thyromegaly   Back:     No kyphosis present, no scoliosis present, no skin lesions,      erythema or scars, no tenderness to percussion or                   palpation,   range of motion normal   Lungs:     Clear to auscultation,respirations regular, even and                  unlabored    Heart:    Regular rhythm and normal rate, normal S1 and S2, no            murmur, no gallop, no rub, no click   Chest Wall:    No abnormalities observed   Abdomen:     Normal bowel sounds, no masses, no organomegaly, soft        non-tender, non-distended, no guarding, no rebound                tenderness   Rectal:   Resolving thrombosed external hemorrhoid without evidence of infection or gangrene   Extremities:   Moves all extremities well, no edema, no cyanosis, no             redness   Pulses:   Pulses palpable and equal bilaterally   Skin:   No bleeding, bruising or rash   Lymph nodes:   No palpable adenopathy   Neurologic:   A/o x 4 with no deficits       Results Review:   {Results Review:[id number]::\"I reviewed the patient's new clinical results.\"    LABS/IMAGING:  Results for orders placed or performed in visit on 06/26/24   Hemoglobin A1c    Specimen: Arm, Right; Blood   Result Value Ref Range    Hemoglobin A1C 6.30 (H) 4.80 - 5.60 %        Result Review :     Assessment & Plan     Resolving thrombosed external hemorrhoid    Discussion with patient.  I discussed the possibility of proceeding with rubber band ligation to help his hemorrhoid symptoms.  He does not wish to proceed at this time.  I instructed him to start over-the-counter fiber.  Repeat colonoscopy per Dr. Maynard in the future.  Follow-up as needed.  All questions answered.  He agrees with the plan.  Thank you for the consult.              This document has been electronically signed by Dinesh Jimenez MD  July 3, 2024 13:30 EDT  "

## 2025-01-20 ENCOUNTER — OFFICE VISIT (OUTPATIENT)
Dept: FAMILY MEDICINE CLINIC | Facility: CLINIC | Age: 65
End: 2025-01-20
Payer: COMMERCIAL

## 2025-01-20 VITALS
WEIGHT: 184 LBS | DIASTOLIC BLOOD PRESSURE: 76 MMHG | OXYGEN SATURATION: 98 % | SYSTOLIC BLOOD PRESSURE: 120 MMHG | HEART RATE: 78 BPM | BODY MASS INDEX: 28.88 KG/M2 | HEIGHT: 67 IN | TEMPERATURE: 98.3 F

## 2025-01-20 DIAGNOSIS — E11.43 TYPE 2 DIABETES MELLITUS WITH DIABETIC AUTONOMIC NEUROPATHY, WITHOUT LONG-TERM CURRENT USE OF INSULIN: Primary | ICD-10-CM

## 2025-01-20 DIAGNOSIS — Z12.5 PROSTATE CANCER SCREENING: ICD-10-CM

## 2025-01-20 DIAGNOSIS — Z12.11 COLON CANCER SCREENING: ICD-10-CM

## 2025-01-20 DIAGNOSIS — E11.65 TYPE 2 DIABETES MELLITUS WITH HYPERGLYCEMIA, WITHOUT LONG-TERM CURRENT USE OF INSULIN: ICD-10-CM

## 2025-01-20 DIAGNOSIS — E03.9 ACQUIRED HYPOTHYROIDISM: ICD-10-CM

## 2025-01-20 DIAGNOSIS — I10 HYPERTENSION, ESSENTIAL: ICD-10-CM

## 2025-01-20 DIAGNOSIS — E78.2 MIXED HYPERLIPIDEMIA: ICD-10-CM

## 2025-01-20 LAB
ALBUMIN SERPL-MCNC: 4.4 G/DL (ref 3.5–5.2)
ALBUMIN UR-MCNC: <1.2 MG/DL
ALBUMIN/GLOB SERPL: 1.5 G/DL
ALP SERPL-CCNC: 60 U/L (ref 39–117)
ALT SERPL W P-5'-P-CCNC: 23 U/L (ref 1–41)
ANION GAP SERPL CALCULATED.3IONS-SCNC: 15 MMOL/L (ref 5–15)
AST SERPL-CCNC: 17 U/L (ref 1–40)
BASOPHILS # BLD AUTO: 0.09 10*3/MM3 (ref 0–0.2)
BASOPHILS NFR BLD AUTO: 1.2 % (ref 0–1.5)
BILIRUB SERPL-MCNC: 0.8 MG/DL (ref 0–1.2)
BUN SERPL-MCNC: 12 MG/DL (ref 8–23)
BUN/CREAT SERPL: 11.5 (ref 7–25)
CALCIUM SPEC-SCNC: 10 MG/DL (ref 8.6–10.5)
CHLORIDE SERPL-SCNC: 101 MMOL/L (ref 98–107)
CHOLEST SERPL-MCNC: 155 MG/DL (ref 0–200)
CO2 SERPL-SCNC: 26 MMOL/L (ref 22–29)
CREAT SERPL-MCNC: 1.04 MG/DL (ref 0.76–1.27)
DEPRECATED RDW RBC AUTO: 43.4 FL (ref 37–54)
EGFRCR SERPLBLD CKD-EPI 2021: 79.2 ML/MIN/1.73
EOSINOPHIL # BLD AUTO: 0.24 10*3/MM3 (ref 0–0.4)
EOSINOPHIL NFR BLD AUTO: 3.1 % (ref 0.3–6.2)
ERYTHROCYTE [DISTWIDTH] IN BLOOD BY AUTOMATED COUNT: 13.2 % (ref 12.3–15.4)
GLOBULIN UR ELPH-MCNC: 3 GM/DL
GLUCOSE SERPL-MCNC: 114 MG/DL (ref 65–99)
HBA1C MFR BLD: 6.3 % (ref 4.8–5.6)
HCT VFR BLD AUTO: 47.7 % (ref 37.5–51)
HDLC SERPL-MCNC: 35 MG/DL (ref 40–60)
HGB BLD-MCNC: 17 G/DL (ref 13–17.7)
IMM GRANULOCYTES # BLD AUTO: 0.03 10*3/MM3 (ref 0–0.05)
IMM GRANULOCYTES NFR BLD AUTO: 0.4 % (ref 0–0.5)
LDLC SERPL CALC-MCNC: 90 MG/DL (ref 0–100)
LDLC/HDLC SERPL: 2.43 {RATIO}
LYMPHOCYTES # BLD AUTO: 1.94 10*3/MM3 (ref 0.7–3.1)
LYMPHOCYTES NFR BLD AUTO: 24.8 % (ref 19.6–45.3)
MCH RBC QN AUTO: 32.1 PG (ref 26.6–33)
MCHC RBC AUTO-ENTMCNC: 35.6 G/DL (ref 31.5–35.7)
MCV RBC AUTO: 90.2 FL (ref 79–97)
MONOCYTES # BLD AUTO: 0.65 10*3/MM3 (ref 0.1–0.9)
MONOCYTES NFR BLD AUTO: 8.3 % (ref 5–12)
NEUTROPHILS NFR BLD AUTO: 4.86 10*3/MM3 (ref 1.7–7)
NEUTROPHILS NFR BLD AUTO: 62.2 % (ref 42.7–76)
NRBC BLD AUTO-RTO: 0 /100 WBC (ref 0–0.2)
PLATELET # BLD AUTO: 287 10*3/MM3 (ref 140–450)
PMV BLD AUTO: 10.5 FL (ref 6–12)
POTASSIUM SERPL-SCNC: 4.5 MMOL/L (ref 3.5–5.2)
PROT SERPL-MCNC: 7.4 G/DL (ref 6–8.5)
PSA SERPL-MCNC: 1.52 NG/ML (ref 0–4)
RBC # BLD AUTO: 5.29 10*6/MM3 (ref 4.14–5.8)
SODIUM SERPL-SCNC: 142 MMOL/L (ref 136–145)
TRIGL SERPL-MCNC: 174 MG/DL (ref 0–150)
TSH SERPL DL<=0.05 MIU/L-ACNC: 3.87 UIU/ML (ref 0.27–4.2)
VLDLC SERPL-MCNC: 30 MG/DL (ref 5–40)
WBC NRBC COR # BLD AUTO: 7.81 10*3/MM3 (ref 3.4–10.8)

## 2025-01-20 PROCEDURE — G0103 PSA SCREENING: HCPCS | Performed by: STUDENT IN AN ORGANIZED HEALTH CARE EDUCATION/TRAINING PROGRAM

## 2025-01-20 PROCEDURE — 80050 GENERAL HEALTH PANEL: CPT | Performed by: STUDENT IN AN ORGANIZED HEALTH CARE EDUCATION/TRAINING PROGRAM

## 2025-01-20 PROCEDURE — 83036 HEMOGLOBIN GLYCOSYLATED A1C: CPT | Performed by: STUDENT IN AN ORGANIZED HEALTH CARE EDUCATION/TRAINING PROGRAM

## 2025-01-20 PROCEDURE — 99214 OFFICE O/P EST MOD 30 MIN: CPT | Performed by: STUDENT IN AN ORGANIZED HEALTH CARE EDUCATION/TRAINING PROGRAM

## 2025-01-20 PROCEDURE — 80061 LIPID PANEL: CPT | Performed by: STUDENT IN AN ORGANIZED HEALTH CARE EDUCATION/TRAINING PROGRAM

## 2025-01-20 PROCEDURE — 82043 UR ALBUMIN QUANTITATIVE: CPT | Performed by: STUDENT IN AN ORGANIZED HEALTH CARE EDUCATION/TRAINING PROGRAM

## 2025-01-20 RX ORDER — ATORVASTATIN CALCIUM 20 MG/1
20 TABLET, FILM COATED ORAL DAILY
Qty: 90 TABLET | Refills: 3 | Status: SHIPPED | OUTPATIENT
Start: 2025-01-20

## 2025-01-20 RX ORDER — METFORMIN HYDROCHLORIDE 500 MG/1
2000 TABLET, EXTENDED RELEASE ORAL
Qty: 360 TABLET | Refills: 3 | Status: SHIPPED | OUTPATIENT
Start: 2025-01-20

## 2025-01-20 RX ORDER — LEVOTHYROXINE SODIUM 50 UG/1
50 TABLET ORAL
Qty: 90 TABLET | Refills: 3 | Status: SHIPPED | OUTPATIENT
Start: 2025-01-20

## 2025-01-20 RX ORDER — VALSARTAN AND HYDROCHLOROTHIAZIDE 160; 12.5 MG/1; MG/1
1 TABLET, FILM COATED ORAL DAILY
Qty: 270 TABLET | Refills: 3 | Status: SHIPPED | OUTPATIENT
Start: 2025-01-20

## 2025-01-20 NOTE — ASSESSMENT & PLAN NOTE
Orders:    levothyroxine (SYNTHROID, LEVOTHROID) 50 MCG tablet; Take 1 tablet by mouth Daily.    CT Cardiac Calcium Score Without Dye; Future    Vascular Screening (Bundle) CAR; Future

## 2025-01-20 NOTE — PROGRESS NOTES
"Chief Complaint  Diabetes (6 month follow up ) and Shoulder Pain (Back of left shoulder )    Subjective      Bruno Morton is a 64 y.o. male who presents to [facility]     Bruno is a [facility] . He refers he is doing good. No complaints Today. He has likely upper back muscle strain that is improving. Patient diabetes is controlled. Will obtain labs today. Also, he will like to obtain screening CT calcium and US. Order placed. HTN is controlled. No changes in meds.     Objective   Vital Signs:   Vitals:    01/20/25 0810   BP: 120/76   Pulse: 78   Temp: 98.3 °F (36.8 °C)   TempSrc: Temporal   SpO2: 98%   Weight: 83.5 kg (184 lb)   Height: 170.2 cm (67.01\")     Body mass index is 28.81 kg/m².    Wt Readings from Last 3 Encounters:   01/20/25 83.5 kg (184 lb)   07/02/24 83.6 kg (184 lb 3.2 oz)   06/26/24 83.7 kg (184 lb 9.6 oz)     BP Readings from Last 3 Encounters:   01/20/25 120/76   07/02/24 130/72   06/26/24 110/75       Health Maintenance   Topic Date Due    TDAP/TD VACCINES (1 - Tdap) Never done    ZOSTER VACCINE (1 of 2) Never done    Hepatitis B (1 of 3 - Risk 3-dose series) Never done    DIABETIC EYE EXAM  01/24/2024    ANNUAL PHYSICAL  06/06/2024    HEMOGLOBIN A1C  12/26/2024    LIPID PANEL  01/17/2025    COVID-19 Vaccine (6 - 2024-25 season) 01/22/2025 (Originally 9/1/2024)    DIABETIC FOOT EXAM  06/26/2025    COLORECTAL CANCER SCREENING  11/06/2025    BMI FOLLOWUP  01/20/2026    HEPATITIS C SCREENING  Completed    INFLUENZA VACCINE  Completed    Pneumococcal Vaccine 65+  Completed    URINE MICROALBUMIN  Discontinued       Physical Exam  Vitals reviewed.   HENT:      Head: Normocephalic.      Mouth/Throat:      Mouth: Mucous membranes are moist.   Eyes:      Pupils: Pupils are equal, round, and reactive to light.   Cardiovascular:      Rate and Rhythm: Normal rate.   Abdominal:      General: Abdomen is flat.   Musculoskeletal:         General: Normal range of motion.    "   Cervical back: Normal range of motion.   Skin:     General: Skin is warm.      Capillary Refill: Capillary refill takes less than 2 seconds.   Neurological:      Mental Status: He is alert.       Assessment & Plan  Type 2 diabetes mellitus with diabetic autonomic neuropathy, without long-term current use of insulin  Diabetes is stable.   Continue current treatment regimen.  Diabetes will be reassessed in 6 months    Orders:    Comprehensive Metabolic Panel    TSH    Lipid Panel    CBC & Differential    Hemoglobin A1c    MicroAlbumin, Urine, Random - Urine, Clean Catch; Future    CT Cardiac Calcium Score Without Dye; Future    Vascular Screening (Bundle) CAR; Future    MicroAlbumin, Urine, Random - Urine, Clean Catch    Type 2 diabetes mellitus with hyperglycemia, without long-term current use of insulin  Diabetes is stable.   Continue current treatment regimen.  Diabetes will be reassessed in 6 months    Orders:    empagliflozin (JARDIANCE) 10 MG tablet tablet; Take 1 tablet by mouth Every Morning.    metFORMIN ER (GLUCOPHAGE-XR) 500 MG 24 hr tablet; Take 4 tablets (2000mg) by mouth once daily with the evening meal    CT Cardiac Calcium Score Without Dye; Future    Vascular Screening (Bundle) CAR; Future    Mixed hyperlipidemia   Lipid abnormalities are stable    Plan:  Continue same medication/s without change.      Discussed medication dosage, use, side effects, and goals of treatment in detail.    Counseled patient on lifestyle modifications to help control hyperlipidemia.     Patient Treatment Goals:   LDL goal is less than 70    Followup in 6 months.    Orders:    atorvastatin (LIPITOR) 20 MG tablet; Take 1 tablet by mouth Daily.    CT Cardiac Calcium Score Without Dye; Future    Vascular Screening (Bundle) CAR; Future    Hypertension, essential  Hypertension is stable and controlled  Continue current treatment regimen.  Blood pressure will be reassessed in 6 months.    Orders:     valsartan-hydrochlorothiazide (DIOVAN-HCT) 160-12.5 MG per tablet; Take 1 tablet by mouth Daily.    CT Cardiac Calcium Score Without Dye; Future    Vascular Screening (Bundle) CAR; Future    Acquired hypothyroidism    Orders:    levothyroxine (SYNTHROID, LEVOTHROID) 50 MCG tablet; Take 1 tablet by mouth Daily.    CT Cardiac Calcium Score Without Dye; Future    Vascular Screening (Bundle) CAR; Future    Prostate cancer screening    Orders:    PSA SCREENING; Future    PSA SCREENING    Colon cancer screening    Orders:    Ambulatory Referral For Screening Colonoscopy               I spent 25 minutes caring for Bruno on this date of service. This time includes time spent by me in the following activities:preparing for the visit, reviewing tests, obtaining and/or reviewing a separately obtained history, performing a medically appropriate examination and/or evaluation, counseling and educating the patient/family/caregiver, ordering medications, tests, or procedures, referring and communicating with other health care professionals, documenting information in the medical record, independently interpreting results and communicating that information with the patient/family/caregiver, care coordination.    FOLLOW UP  Return in about 6 months (around 7/20/2025).  Patient was given instructions and counseling regarding his condition or for health maintenance advice. Please see specific information pulled into the AVS if appropriate.       Spencer Roberts MD  01/20/25  08:49 EST    CURRENT & DISCONTINUED MEDICATIONS  Current Outpatient Medications   Medication Instructions    atorvastatin (LIPITOR) 20 mg, Oral, Daily    empagliflozin (JARDIANCE) 10 mg, Oral, Every Morning    FreeStyle Unistick II Lancets misc use as directed 3 times weekly    glucose blood (FreeStyle Precision Lc Test) test strip test blood sugar daily    levothyroxine (SYNTHROID, LEVOTHROID) 50 mcg, Oral, Daily - RT    Lidocaine-nitroglycerin  2.5-1 % ointment 1 Application, Apply externally, 2 Times Daily    metFORMIN ER (GLUCOPHAGE-XR) 500 MG 24 hr tablet Take 4 tablets (2000mg) by mouth once daily with the evening meal    polyethylene glycol (MIRALAX) 17 g    valsartan-hydrochlorothiazide (DIOVAN-HCT) 160-12.5 MG per tablet 1 tablet, Oral, Daily       Medications Discontinued During This Encounter   Medication Reason    atorvastatin (LIPITOR) 20 MG tablet Reorder    metFORMIN ER (GLUCOPHAGE-XR) 500 MG 24 hr tablet Reorder    empagliflozin (JARDIANCE) 10 MG tablet tablet Reorder    valsartan-hydrochlorothiazide (DIOVAN-HCT) 160-12.5 MG per tablet Reorder    levothyroxine (SYNTHROID, LEVOTHROID) 50 MCG tablet Reorder

## 2025-01-20 NOTE — ASSESSMENT & PLAN NOTE
Hypertension is stable and controlled  Continue current treatment regimen.  Blood pressure will be reassessed in 6 months.    Orders:    valsartan-hydrochlorothiazide (DIOVAN-HCT) 160-12.5 MG per tablet; Take 1 tablet by mouth Daily.    CT Cardiac Calcium Score Without Dye; Future    Vascular Screening (Bundle) CAR; Future

## 2025-01-20 NOTE — ASSESSMENT & PLAN NOTE
Lipid abnormalities are stable    Plan:  Continue same medication/s without change.      Discussed medication dosage, use, side effects, and goals of treatment in detail.    Counseled patient on lifestyle modifications to help control hyperlipidemia.     Patient Treatment Goals:   LDL goal is less than 70    Followup in 6 months.    Orders:    atorvastatin (LIPITOR) 20 MG tablet; Take 1 tablet by mouth Daily.    CT Cardiac Calcium Score Without Dye; Future    Vascular Screening (Bundle) CAR; Future

## 2025-01-20 NOTE — ASSESSMENT & PLAN NOTE
Diabetes is stable.   Continue current treatment regimen.  Diabetes will be reassessed in 6 months    Orders:    empagliflozin (JARDIANCE) 10 MG tablet tablet; Take 1 tablet by mouth Every Morning.    metFORMIN ER (GLUCOPHAGE-XR) 500 MG 24 hr tablet; Take 4 tablets (2000mg) by mouth once daily with the evening meal    CT Cardiac Calcium Score Without Dye; Future    Vascular Screening (Bundle) CAR; Future

## 2025-01-20 NOTE — ASSESSMENT & PLAN NOTE
Diabetes is stable.   Continue current treatment regimen.  Diabetes will be reassessed in 6 months    Orders:    Comprehensive Metabolic Panel    TSH    Lipid Panel    CBC & Differential    Hemoglobin A1c    MicroAlbumin, Urine, Random - Urine, Clean Catch; Future    CT Cardiac Calcium Score Without Dye; Future    Vascular Screening (Bundle) CAR; Future    MicroAlbumin, Urine, Random - Urine, Clean Catch

## 2025-01-22 ENCOUNTER — TELEPHONE (OUTPATIENT)
Dept: GASTROENTEROLOGY | Facility: CLINIC | Age: 65
End: 2025-01-22
Payer: COMMERCIAL

## 2025-02-11 ENCOUNTER — OFFICE VISIT (OUTPATIENT)
Dept: FAMILY MEDICINE CLINIC | Facility: CLINIC | Age: 65
End: 2025-02-11
Payer: COMMERCIAL

## 2025-02-11 VITALS
HEIGHT: 67 IN | WEIGHT: 185 LBS | OXYGEN SATURATION: 97 % | TEMPERATURE: 99.6 F | BODY MASS INDEX: 29.03 KG/M2 | SYSTOLIC BLOOD PRESSURE: 115 MMHG | DIASTOLIC BLOOD PRESSURE: 65 MMHG | HEART RATE: 117 BPM

## 2025-02-11 DIAGNOSIS — J10.1 INFLUENZA A: ICD-10-CM

## 2025-02-11 DIAGNOSIS — R05.9 COUGH, UNSPECIFIED TYPE: Primary | ICD-10-CM

## 2025-02-11 DIAGNOSIS — H61.23 IMPACTED CERUMEN OF BOTH EARS: ICD-10-CM

## 2025-02-11 LAB
EXPIRATION DATE: ABNORMAL
FLUAV AG UPPER RESP QL IA.RAPID: DETECTED
FLUBV AG UPPER RESP QL IA.RAPID: NOT DETECTED
INTERNAL CONTROL: ABNORMAL
Lab: ABNORMAL
SARS-COV-2 AG UPPER RESP QL IA.RAPID: NOT DETECTED

## 2025-02-11 PROCEDURE — 69209 REMOVE IMPACTED EAR WAX UNI: CPT | Performed by: STUDENT IN AN ORGANIZED HEALTH CARE EDUCATION/TRAINING PROGRAM

## 2025-02-11 PROCEDURE — 99214 OFFICE O/P EST MOD 30 MIN: CPT | Performed by: STUDENT IN AN ORGANIZED HEALTH CARE EDUCATION/TRAINING PROGRAM

## 2025-02-11 PROCEDURE — 87428 SARSCOV & INF VIR A&B AG IA: CPT | Performed by: STUDENT IN AN ORGANIZED HEALTH CARE EDUCATION/TRAINING PROGRAM

## 2025-02-11 RX ORDER — GUAIFENESIN/DEXTROMETHORPHAN 100-10MG/5
5 SYRUP ORAL 3 TIMES DAILY PRN
Qty: 118 ML | Refills: 2 | Status: SHIPPED | OUTPATIENT
Start: 2025-02-11

## 2025-02-11 RX ORDER — OSELTAMIVIR PHOSPHATE 75 MG/1
75 CAPSULE ORAL 2 TIMES DAILY
Qty: 10 CAPSULE | Refills: 0 | Status: SHIPPED | OUTPATIENT
Start: 2025-02-11 | End: 2025-02-16

## 2025-02-11 NOTE — PROGRESS NOTES
"Chief Complaint  Fever, Chills, Dizziness, and Cough    Subjective      Bruno Morton is a 64 y.o. male who presents to [facility]     History of Present Illness  The patient is a 64-year-old male who presents for evaluation of fever, headache, nonproductive cough, and dizziness.    He reports experiencing a nonproductive cough that has been persistent since yesterday. He also mentions a fever, which was recorded as 101 degrees at home and 99 degrees in the clinic. He has taken aspirin to manage the fever. Additionally, he reports dizziness but does not report any episodes of syncope. He is currently on antihypertensive medication. He does not report any otalgia or otorrhea. He typically undergoes cerumen removal twice annually and expresses a desire to have this procedure performed today        Patient Care Team:  Spencer Dent MD as PCP - General (Internal Medicine)    Review of Systems  Per HPI    Objective   Vital Signs:   Vitals:    02/11/25 1332   BP: 115/65   BP Location: Left arm   Patient Position: Sitting   Cuff Size: Adult   Pulse: 117   Temp: 99.6 °F (37.6 °C)   TempSrc: Temporal   SpO2: 97%   Weight: 83.9 kg (185 lb)   Height: 170.2 cm (67.01\")     Body mass index is 28.97 kg/m².    Wt Readings from Last 3 Encounters:   02/11/25 83.9 kg (185 lb)   01/20/25 83.5 kg (184 lb)   07/02/24 83.6 kg (184 lb 3.2 oz)     BP Readings from Last 3 Encounters:   02/11/25 115/65   01/20/25 120/76   07/02/24 130/72       Health Maintenance   Topic Date Due    TDAP/TD VACCINES (1 - Tdap) Never done    ZOSTER VACCINE (1 of 2) Never done    Hepatitis B (1 of 3 - Risk 3-dose series) Never done    DIABETIC EYE EXAM  01/24/2024    ANNUAL PHYSICAL  06/06/2024    DIABETIC FOOT EXAM  06/26/2025    HEMOGLOBIN A1C  07/20/2025    COLORECTAL CANCER SCREENING  11/06/2025    LIPID PANEL  01/20/2026    BMI FOLLOWUP  01/20/2026    HEPATITIS C SCREENING  Completed    COVID-19 Vaccine  " Completed    INFLUENZA VACCINE  Completed    Pneumococcal Vaccine 50+  Completed    URINE MICROALBUMIN-CREATININE RATIO (uACR)  Discontinued       Physical Exam  Constitutional:       Appearance: Normal appearance.   HENT:      Head: Normocephalic and atraumatic.      Right Ear: There is impacted cerumen.      Left Ear: There is impacted cerumen.      Mouth/Throat:      Mouth: Mucous membranes are moist.      Pharynx: Posterior oropharyngeal erythema present. No oropharyngeal exudate.   Eyes:      Pupils: Pupils are equal, round, and reactive to light.   Cardiovascular:      Rate and Rhythm: Normal rate and regular rhythm.      Pulses: Normal pulses.      Heart sounds: Normal heart sounds.   Pulmonary:      Effort: Pulmonary effort is normal.      Breath sounds: Normal breath sounds.   Neurological:      General: No focal deficit present.      Mental Status: He is alert and oriented to person, place, and time.   Psychiatric:         Mood and Affect: Mood normal.         Behavior: Behavior normal.         Physical Exam  There is a little bit more wax in the ears.  Lungs sound normal.  Heart sounds normal.    Vital Signs  Blood pressure is 115/65.       Result Review   The following data was reviewed by: Tabitha Jones MD on 02/11/2025:  [x]  Tests & Results  []  Hospitalization/Emergency Department/Urgent Care  []  Internal/External Consultant Notes      Results         ASSESSMENT/PLAN  Diagnoses and all orders for this visit:    1. Cough, unspecified type (Primary)  -     POCT SARS-CoV-2 Antigen CADY + Flu  -     guaiFENesin-dextromethorphan (ROBITUSSIN DM) 100-10 MG/5ML syrup; Take 5 mL by mouth 3 (Three) Times a Day As Needed for Cough.  Dispense: 118 mL; Refill: 2    2. Impacted cerumen of both ears  Ear Cerumen Removal    Date/Time: 2/11/2025 2:03 PM    Performed by: Tabitha Jones MD  Authorized by: Tabitha Jones MD    Anesthesia:  Local Anesthetic: none  Location details: left ear and right ear  Patient  tolerance: patient tolerated the procedure well with no immediate complications  Procedure type: irrigation   Sedation:  Patient sedated: no           3. Influenza A  -     oseltamivir (Tamiflu) 75 MG capsule; Take 1 capsule by mouth 2 (Two) Times a Day for 5 days.  Dispense: 10 capsule; Refill: 0        Assessment & Plan  1. Viral infection.  He reports a nonproductive cough that started yesterday, along with a fever that was 101 at home and 99 in the clinic. He has taken aspirin for the fever. He is advised to take Tylenol or ibuprofen for fever management and to avoid aspirin due to its potential to increase the risk of bleeding. He tested positive for Influenza A, prescribed Tamiflu for 5 days. He is also advised to maintain adequate hydration. A prescription for Robitussin DM has been provided to manage his cough. He is instructed to monitor his blood pressure while on this medication.    2. Cerumen impaction.  He has more ear wax than usual, which is blocking his view. He usually gets his ears cleaned twice a year. Ear cleaning will be performed today.    3. Health maintenance.  He has not yet had a diabetic eye exam this year. He is advised to schedule an appointment with his eye doctor for the exam.       Bruno Morton  reports that he has never smoked. He has never used smokeless tobacco.       FOLLOW UP  Return if symptoms worsen or fail to improve.  Patient was given instructions and counseling regarding his condition or for health maintenance advice. Please see specific information pulled into the AVS if appropriate.       Tabitha Jones MD  02/11/25  14:03 EST    Patient or patient representative verbalized consent for the use of Ambient Listening during the visit with  Tabitha Jones MD for chart documentation. 2/11/2025  13:58 EST

## 2025-04-01 ENCOUNTER — HOSPITAL ENCOUNTER (OUTPATIENT)
Dept: CARDIOLOGY | Facility: HOSPITAL | Age: 65
Discharge: HOME | End: 2025-04-01

## 2025-04-01 ENCOUNTER — HOSPITAL ENCOUNTER (OUTPATIENT)
Dept: CT IMAGING | Facility: HOSPITAL | Age: 65
Discharge: HOME | End: 2025-04-01

## 2025-04-01 DIAGNOSIS — I10 HYPERTENSION, ESSENTIAL: ICD-10-CM

## 2025-04-01 DIAGNOSIS — E78.2 MIXED HYPERLIPIDEMIA: ICD-10-CM

## 2025-04-01 DIAGNOSIS — E11.65 TYPE 2 DIABETES MELLITUS WITH HYPERGLYCEMIA, WITHOUT LONG-TERM CURRENT USE OF INSULIN: ICD-10-CM

## 2025-04-01 DIAGNOSIS — E11.43 TYPE 2 DIABETES MELLITUS WITH DIABETIC AUTONOMIC NEUROPATHY, WITHOUT LONG-TERM CURRENT USE OF INSULIN: ICD-10-CM

## 2025-04-01 DIAGNOSIS — E03.9 ACQUIRED HYPOTHYROIDISM: ICD-10-CM

## 2025-04-01 PROCEDURE — VASCULARSCN2 VASCULAR SCREENING (BUNDLE) CAR: Performed by: INTERNAL MEDICINE

## 2025-04-01 PROCEDURE — 75571 CT HRT W/O DYE W/CA TEST: CPT

## 2025-04-01 PROCEDURE — 93799 UNLISTED CV SVC/PROCEDURE: CPT

## 2025-04-02 ENCOUNTER — RESULTS FOLLOW-UP (OUTPATIENT)
Dept: FAMILY MEDICINE CLINIC | Facility: CLINIC | Age: 65
End: 2025-04-02
Payer: COMMERCIAL

## 2025-04-02 DIAGNOSIS — I25.10 CORONARY ARTERY DISEASE DUE TO LIPID RICH PLAQUE: Primary | ICD-10-CM

## 2025-04-02 DIAGNOSIS — I65.29 CAROTID ATHEROSCLEROSIS, UNSPECIFIED LATERALITY: ICD-10-CM

## 2025-04-02 DIAGNOSIS — E04.1 THYROID NODULE: ICD-10-CM

## 2025-04-02 DIAGNOSIS — I25.83 CORONARY ARTERY DISEASE DUE TO LIPID RICH PLAQUE: Primary | ICD-10-CM

## 2025-04-05 LAB
BH CV VAS SCREENING CAROTID CCA LEFT: 85 CM/SEC
BH CV VAS SCREENING CAROTID CCA RIGHT: 98 CM/SEC
BH CV VAS SCREENING CAROTID ICA LEFT: 129 CM/SEC
BH CV VAS SCREENING CAROTID ICA RIGHT: 68 CM/SEC
BH CV VAS STUDY COMMENTS THYROID NODULE LT 1ST MEASURE: 0.91 CM
BH CV VAS STUDY COMMENTS THYROID NODULE LT 2ND MEASUR: 0.64 CM
BH CV XLRA MEAS - MID AO DIAM: 2 CM
BH CV XLRA MEAS - PAD LEFT ABI PT: 1.16
BH CV XLRA MEAS - PAD LEFT ARM: 130 MMHG
BH CV XLRA MEAS - PAD LEFT LEG PT: 151 MMHG
BH CV XLRA MEAS - PAD RIGHT ABI PT: 1.14
BH CV XLRA MEAS - PAD RIGHT ARM: 130 MMHG
BH CV XLRA MEAS - PAD RIGHT LEG PT: 148 MMHG
BH CV XLRA MEAS LEFT DIST CCA EDV: -23 CM/SEC
BH CV XLRA MEAS LEFT DIST CCA PSV: -85.1 CM/SEC
BH CV XLRA MEAS LEFT ICA/CCA RATIO: 1.5
BH CV XLRA MEAS LEFT PROX ICA EDV: -25.1 CM/SEC
BH CV XLRA MEAS LEFT PROX ICA PSV: -129 CM/SEC
BH CV XLRA MEAS RIGHT DIST CCA EDV: 20.5 CM/SEC
BH CV XLRA MEAS RIGHT DIST CCA PSV: 98.2 CM/SEC
BH CV XLRA MEAS RIGHT ICA/CCA RATIO: 0.7
BH CV XLRA MEAS RIGHT PROX ICA EDV: 18 CM/SEC
BH CV XLRA MEAS RIGHT PROX ICA PSV: 68.3 CM/SEC

## 2025-04-11 DIAGNOSIS — I65.29 CAROTID ATHEROSCLEROSIS, UNSPECIFIED LATERALITY: Primary | ICD-10-CM

## 2025-04-11 DIAGNOSIS — I25.83 CORONARY ARTERY DISEASE DUE TO LIPID RICH PLAQUE: ICD-10-CM

## 2025-04-11 DIAGNOSIS — I25.10 CORONARY ARTERY DISEASE DUE TO LIPID RICH PLAQUE: ICD-10-CM

## 2025-05-01 ENCOUNTER — OFFICE VISIT (OUTPATIENT)
Dept: CARDIOLOGY | Facility: CLINIC | Age: 65
End: 2025-05-01
Payer: COMMERCIAL

## 2025-05-01 VITALS
HEART RATE: 73 BPM | HEIGHT: 67 IN | DIASTOLIC BLOOD PRESSURE: 86 MMHG | WEIGHT: 183 LBS | SYSTOLIC BLOOD PRESSURE: 139 MMHG | BODY MASS INDEX: 28.72 KG/M2

## 2025-05-01 DIAGNOSIS — I10 HYPERTENSION, ESSENTIAL: ICD-10-CM

## 2025-05-01 DIAGNOSIS — I25.10 CORONARY ARTERY CALCIFICATION: Primary | ICD-10-CM

## 2025-05-01 DIAGNOSIS — E78.2 MIXED HYPERLIPIDEMIA: ICD-10-CM

## 2025-05-01 RX ORDER — ASPIRIN 81 MG/1
81 TABLET ORAL DAILY
Start: 2025-05-01

## 2025-05-01 RX ORDER — ATORVASTATIN CALCIUM 40 MG/1
40 TABLET, FILM COATED ORAL NIGHTLY
Qty: 90 TABLET | Refills: 1 | Status: SHIPPED | OUTPATIENT
Start: 2025-05-01

## 2025-05-01 NOTE — PROGRESS NOTES
CARDIOLOGY INITIAL CONSULT       Chief Complaint  Establish Care (Abnormal coronary calcium score, risk factors for coronary artery disease)    Reason for consultation  Coronary artery disease due to lipid rich plaque    Subjective            Bruno Morton presents to [facility]  History of Present Illness    This is a 64-year-old male with diabetes mellitus on oral agents, hypertension, hyperlipidemia and hypothyroidism. Recently, he underwent coronary calcium scoring due to multiple risk factors for coronary artery disease including family history of CAD.  Patient's father had coronary stents and bypass grafting in his 60s.    He denies any chest pain or pressure.  He does report some fatigue, shortness of breath on severe exertion.  No palpitations or dizziness.    Past History:    Essential hypertension  Diabetes mellitus on oral agents  Mixed hyperlipidemia  Hypothyroidism.    Medical History:  Past Medical History:   Diagnosis Date    Allergic rhinitis     Colitis, ulcerative 1980    Diabetes mellitus, type 2 02/21/2017    Eczematous dermatitis 03/16/2020    Essential hypertension     Hyperlipidemia     Hypothyroid 02/21/2019    Medication management 02/06/2017       Family History: family history includes Heart disease in his father.     Social History: reports that he has never smoked. He has never used smokeless tobacco. He reports that he does not drink alcohol and does not use drugs.    Allergies: Patient has no known allergies.    Current Outpatient Medications on File Prior to Visit   Medication Sig    empagliflozin (JARDIANCE) 10 MG tablet tablet Take 1 tablet by mouth Every Morning.    FreeStyle Unistick II Lancets misc use as directed 3 times weekly    glucose blood (FreeStyle Precision Lc Test) test strip test blood sugar daily    guaiFENesin-dextromethorphan (ROBITUSSIN DM) 100-10 MG/5ML syrup Take 5 mL by mouth 3 (Three) Times a Day As Needed for Cough.     "levothyroxine (SYNTHROID, LEVOTHROID) 50 MCG tablet Take 1 tablet by mouth Daily.    Lidocaine-nitroglycerin 2.5-1 % ointment Apply 1 Application topically 2 (Two) Times a Day.    metFORMIN ER (GLUCOPHAGE-XR) 500 MG 24 hr tablet Take 4 tablets (2000mg) by mouth once daily with the evening meal    polyethylene glycol (MiraLax) 17 GM/SCOOP powder 17 g.    valsartan-hydrochlorothiazide (DIOVAN-HCT) 160-12.5 MG per tablet Take 1 tablet by mouth Daily.    [DISCONTINUED] atorvastatin (LIPITOR) 20 MG tablet Take 1 tablet by mouth Daily.     No current facility-administered medications on file prior to visit.          Review of Systems   Constitutional:  Negative for fatigue, unexpected weight gain and unexpected weight loss.   Eyes:  Negative for double vision.   Respiratory:  Positive for shortness of breath. Negative for cough and wheezing.    Cardiovascular:  Negative for chest pain, palpitations and leg swelling.   Gastrointestinal:  Negative for abdominal pain, nausea and vomiting.   Endocrine: Negative for cold intolerance, heat intolerance, polydipsia and polyuria.   Musculoskeletal:  Negative for arthralgias and back pain.   Skin:  Negative for color change.   Neurological:  Negative for dizziness, syncope, weakness and headache.   Hematological:  Does not bruise/bleed easily.        Objective     /86   Pulse 73   Ht 170.2 cm (67\")   Wt 83 kg (183 lb)   BMI 28.66 kg/m²       Physical Exam  Constitutional:       General: He is awake. He is not in acute distress.     Appearance: Normal appearance.   Eyes:      Extraocular Movements: Extraocular movements intact.      Pupils: Pupils are equal, round, and reactive to light.   Neck:      Thyroid: No thyromegaly.      Vascular: No carotid bruit or JVD.   Cardiovascular:      Rate and Rhythm: Normal rate and regular rhythm.      Chest Wall: PMI is not displaced.      Heart sounds: Normal heart sounds, S1 normal and S2 normal. No murmur heard.     No friction " rub. No gallop. No S3 or S4 sounds.   Pulmonary:      Effort: Pulmonary effort is normal. No respiratory distress.      Breath sounds: Normal breath sounds. No wheezing, rhonchi or rales.   Abdominal:      General: Bowel sounds are normal.      Palpations: Abdomen is soft.      Tenderness: There is no abdominal tenderness.   Musculoskeletal:      Cervical back: Neck supple.      Right lower leg: No edema.      Left lower leg: No edema.   Skin:     Nails: There is no clubbing.   Neurological:      General: No focal deficit present.      Mental Status: He is alert and oriented to person, place, and time.           Result Review :     The following data was reviewed by: Ron Mustafa MD on 05/01/2025:    CMP          1/20/2025    08:53   CMP   Glucose 114    BUN 12    Creatinine 1.04    EGFR 79.2    Sodium 142    Potassium 4.5    Chloride 101    Calcium 10.0    Total Protein 7.4    Albumin 4.4    Globulin 3.0    Total Bilirubin 0.8    Alkaline Phosphatase 60    AST (SGOT) 17    ALT (SGPT) 23    Albumin/Globulin Ratio 1.5    BUN/Creatinine Ratio 11.5    Anion Gap 15.0      CBC          1/20/2025    08:53   CBC   WBC 7.81    RBC 5.29    Hemoglobin 17.0    Hematocrit 47.7    MCV 90.2    MCH 32.1    MCHC 35.6    RDW 13.2    Platelets 287      TSH          1/20/2025    08:53   TSH   TSH 3.870      Lipid Panel          1/20/2025    08:53   Lipid Panel   Total Cholesterol 155    Triglycerides 174    HDL Cholesterol 35    VLDL Cholesterol 30    LDL Cholesterol  90    LDL/HDL Ratio 2.43         Data reviewed: Cardiology studies    CT coronary calcium scoring done on 4/1/2025 showed    Findings:  Agatston scores for individual coronary arteries are as follows:  Left main (LM): 99.6  Left anterior descending (LAD): 506.2  Left circumflex (CX): 99.6  Right coronary artery (RCA): 775.1  Total: 1480.5          ECG 12 Lead    Date/Time: 5/1/2025 1:15 PM  Performed by: Ron Mustafa MD    Authorized by: Ron Mustafa MD   Comparison: not compared with previous ECG   Previous ECG: no previous ECG available  Rhythm: sinus rhythm  Rate: normal  Conduction: conduction normal  ST Segments: ST segments normal  T Waves: T waves normal  QRS axis: normal  Other: no other findings    Clinical impression: normal ECG              Assessment and Plan        Diagnoses and all orders for this visit:    1. Coronary artery calcification (Primary)  Assessment & Plan:  Coronary calcium score is close to 1500, mostly distributed in LAD artery and RCA.  Multiple risk factors for coronary disease including hypertension, diabetes mellitus, hyperlipidemia and family history of CAD.  The findings were discussed in detail with the patient.  He has no chest pain.  He does report shortness of breath with severe exertion.  Recommend to start taking aspirin 81 mg daily.  Continue statins    For further restratification and also to rule out obstructive CAD, we will proceed with a SPECT stress test.  Echocardiogram will be done to evaluate LV function and valvular function.    Orders:  -     Adult Transthoracic Echo Complete W/ Cont if Necessary Per Protocol; Future  -     Stress Test With Myocardial Perfusion One Day; Future  -     aspirin 81 MG EC tablet; Take 1 tablet by mouth Daily.  -     ECG 12 Lead    2. Mixed hyperlipidemia  Assessment & Plan:  Most recent LDL is 90, which is above goal.  He is on same dose of atorvastatin for a while.  Goal LDL is < 70.  Options discussed.  Will increase dose of atorvastatin to 40 mg nightly.    Orders:  -     atorvastatin (LIPITOR) 40 MG tablet; Take 1 tablet by mouth Every Night.  Dispense: 90 tablet; Refill: 1    3. Hypertension, essential  Assessment & Plan:  Blood pressure is normally well-controlled.  Continue valsartan/hydrochlorothiazide at the current dose.            I spent 22 minutes caring for Bruno on this date of service. This time includes time spent by me in the following activities:reviewing tests,  obtaining and/or reviewing a separately obtained history, performing a medically appropriate examination and/or evaluation , ordering medications, tests, or procedures, and documenting information in the medical record    Follow Up     Return for Will schedule f/u after reviewing test results.    Patient was given instructions and counseling regarding his condition or for health maintenance advice. Please see specific information pulled into the AVS if appropriate.

## 2025-05-13 ENCOUNTER — HOSPITAL ENCOUNTER (OUTPATIENT)
Facility: HOSPITAL | Age: 65
Discharge: HOME | End: 2025-05-13
Payer: COMMERCIAL

## 2025-05-13 DIAGNOSIS — I25.10 CORONARY ARTERY CALCIFICATION: ICD-10-CM

## 2025-05-13 PROCEDURE — 93306 TTE W/DOPPLER COMPLETE: CPT

## 2025-05-13 PROCEDURE — 78452 HT MUSCLE IMAGE SPECT MULT: CPT

## 2025-05-13 PROCEDURE — A9502 TC99M TETROFOSMIN: HCPCS | Performed by: INTERNAL MEDICINE

## 2025-05-13 PROCEDURE — 34310000005 TECHNETIUM TETROFOSMIN KIT: Performed by: INTERNAL MEDICINE

## 2025-05-13 PROCEDURE — 93017 CV STRESS TEST TRACING ONLY: CPT

## 2025-05-13 RX ADMIN — TETROFOSMIN 1 DOSE: 1.38 INJECTION, POWDER, LYOPHILIZED, FOR SOLUTION INTRAVENOUS at 08:10

## 2025-05-13 RX ADMIN — TETROFOSMIN 1 DOSE: 1.38 INJECTION, POWDER, LYOPHILIZED, FOR SOLUTION INTRAVENOUS at 06:54

## 2025-05-15 ENCOUNTER — RESULTS FOLLOW-UP (OUTPATIENT)
Dept: CARDIOLOGY | Facility: CLINIC | Age: 65
End: 2025-05-15
Payer: COMMERCIAL

## 2025-05-15 DIAGNOSIS — E78.2 MIXED HYPERLIPIDEMIA: Primary | ICD-10-CM

## 2025-05-15 DIAGNOSIS — I10 HYPERTENSION, ESSENTIAL: ICD-10-CM

## 2025-05-15 LAB
AORTIC DIMENSIONLESS INDEX: 0.66 (DI)
ASCENDING AORTA: 3.1 CM
AV MEAN PRESS GRAD SYS DOP V1V2: 4 MMHG
AV VMAX SYS DOP: 139 CM/SEC
BH CV ECHO MEAS - ACS: 1.4 CM
BH CV ECHO MEAS - AO MAX PG: 7.7 MMHG
BH CV ECHO MEAS - AO ROOT DIAM: 3.1 CM
BH CV ECHO MEAS - AO V2 VTI: 25.3 CM
BH CV ECHO MEAS - AVA(I,D): 2.09 CM2
BH CV ECHO MEAS - EDV(CUBED): 59.3 ML
BH CV ECHO MEAS - EDV(MOD-SP2): 62.5 ML
BH CV ECHO MEAS - EDV(MOD-SP4): 65.3 ML
BH CV ECHO MEAS - EF(MOD-SP2): 65.3 %
BH CV ECHO MEAS - EF(MOD-SP4): 66 %
BH CV ECHO MEAS - ESV(CUBED): 13.8 ML
BH CV ECHO MEAS - ESV(MOD-SP2): 21.7 ML
BH CV ECHO MEAS - ESV(MOD-SP4): 22.2 ML
BH CV ECHO MEAS - FS: 38.5 %
BH CV ECHO MEAS - IVS/LVPW: 1.18 CM
BH CV ECHO MEAS - IVSD: 1.2 CM
BH CV ECHO MEAS - LA DIMENSION: 3.2 CM
BH CV ECHO MEAS - LAT PEAK E' VEL: 7.5 CM/SEC
BH CV ECHO MEAS - LV DIASTOLIC VOL/BSA (35-75): 33.5 CM2
BH CV ECHO MEAS - LV MASS(C)D: 159.3 GRAMS
BH CV ECHO MEAS - LV MAX PG: 3 MMHG
BH CV ECHO MEAS - LV MEAN PG: 2 MMHG
BH CV ECHO MEAS - LV SYSTOLIC VOL/BSA (12-30): 11.4 CM2
BH CV ECHO MEAS - LV V1 MAX: 85.9 CM/SEC
BH CV ECHO MEAS - LV V1 VTI: 16.8 CM
BH CV ECHO MEAS - LVIDD: 3.9 CM
BH CV ECHO MEAS - LVIDS: 2.4 CM
BH CV ECHO MEAS - LVOT AREA: 3.1 CM2
BH CV ECHO MEAS - LVOT DIAM: 2 CM
BH CV ECHO MEAS - LVPWD: 1.1 CM
BH CV ECHO MEAS - MED PEAK E' VEL: 7 CM/SEC
BH CV ECHO MEAS - MV A MAX VEL: 71.3 CM/SEC
BH CV ECHO MEAS - MV DEC TIME: 0.15 SEC
BH CV ECHO MEAS - MV E MAX VEL: 57.1 CM/SEC
BH CV ECHO MEAS - MV E/A: 0.8
BH CV ECHO MEAS - MV MEAN PG: 2 MMHG
BH CV ECHO MEAS - MV V2 VTI: 17.9 CM
BH CV ECHO MEAS - MVA(VTI): 2.9 CM2
BH CV ECHO MEAS - RVDD: 3 CM
BH CV ECHO MEAS - SV(LVOT): 52.8 ML
BH CV ECHO MEAS - SV(MOD-SP2): 40.8 ML
BH CV ECHO MEAS - SV(MOD-SP4): 43.1 ML
BH CV ECHO MEAS - SVI(LVOT): 27.1 ML/M2
BH CV ECHO MEAS - SVI(MOD-SP2): 21 ML/M2
BH CV ECHO MEAS - SVI(MOD-SP4): 22.1 ML/M2
BH CV ECHO MEAS - TAPSE (>1.6): 1.74 CM
BH CV ECHO MEASUREMENTS AVERAGE E/E' RATIO: 7.88
BH CV IMMEDIATE POST RECOVERY TECH DATA SYMPTOMS: NORMAL
BH CV IMMEDIATE POST TECH DATA BLOOD PRESSURE: NORMAL MMHG
BH CV IMMEDIATE POST TECH DATA HEART RATE: 136 BPM
BH CV IMMEDIATE POST TECH DATA OXYGEN SATS: 98 %
BH CV NINE MINUTE RECOVERY TECH DATA BLOOD PRESSURE: NORMAL MMHG
BH CV NINE MINUTE RECOVERY TECH DATA HEART RATE: 95 BPM
BH CV NINE MINUTE RECOVERY TECH DATA OXYGEN SATURATION: 98 %
BH CV NINE MINUTE RECOVERY TECH DATA SYMPTOMS: NORMAL
BH CV REST NUCLEAR ISOTOPE DOSE: 9.7 MCI
BH CV SIX MINUTE RECOVERY TECH DATA BLOOD PRESSURE: NORMAL
BH CV SIX MINUTE RECOVERY TECH DATA HEART RATE: 101 BPM
BH CV SIX MINUTE RECOVERY TECH DATA OXYGEN SATURATION: 98 %
BH CV SIX MINUTE RECOVERY TECH DATA SYMPTOMS: NORMAL
BH CV STRESS BP STAGE 1: NORMAL
BH CV STRESS BP STAGE 2: NORMAL
BH CV STRESS BP STAGE 3: NORMAL
BH CV STRESS DURATION MIN STAGE 1: 3
BH CV STRESS DURATION MIN STAGE 2: 3
BH CV STRESS DURATION MIN STAGE 3: 1
BH CV STRESS DURATION SEC STAGE 1: 0
BH CV STRESS DURATION SEC STAGE 2: 0
BH CV STRESS DURATION SEC STAGE 3: 30
BH CV STRESS GRADE STAGE 1: 10
BH CV STRESS GRADE STAGE 2: 12
BH CV STRESS GRADE STAGE 3: 8
BH CV STRESS HR STAGE 1: 132
BH CV STRESS HR STAGE 2: 160
BH CV STRESS HR STAGE 3: 162
BH CV STRESS METS STAGE 1: 5
BH CV STRESS METS STAGE 2: 7.5
BH CV STRESS METS STAGE 3: 10
BH CV STRESS NUCLEAR ISOTOPE DOSE: 37.6 MCI
BH CV STRESS O2 STAGE 1: 95
BH CV STRESS O2 STAGE 3: 97
BH CV STRESS PROTOCOL 1: NORMAL
BH CV STRESS RECOVERY BP: NORMAL MMHG
BH CV STRESS RECOVERY HR: 94 BPM
BH CV STRESS RECOVERY O2: 98 %
BH CV STRESS SPEED STAGE 1: 1.7
BH CV STRESS SPEED STAGE 2: 2.5
BH CV STRESS SPEED STAGE 3: 2.5
BH CV STRESS STAGE 1: 1
BH CV STRESS STAGE 2: 2
BH CV STRESS STAGE 3: 3
BH CV THREE MINUTE POST TECH DATA BLOOD PRESSURE: NORMAL MMHG
BH CV THREE MINUTE POST TECH DATA HEART RATE: 107 BPM
BH CV THREE MINUTE POST TECH DATA OXYGEN SATURATION: 98 %
BH CV THREE MINUTE RECOVERY TECH DATA SYMPTOM: NORMAL
BH CV XLRA - TDI S': 9.5 CM/SEC
IVRT: 63 MS
LEFT ATRIUM VOLUME INDEX: 14.6 ML/M2
LV EF BIPLANE MOD: 63.6 %
MAXIMAL PREDICTED HEART RATE: 153 BPM
PERCENT MAX PREDICTED HR: 105.88 %
SPECT HRT GATED+EF W RNC IV: 64 %
STRESS BASELINE BP: NORMAL MMHG
STRESS BASELINE HR: 83 BPM
STRESS O2 SAT REST: 93 %
STRESS PERCENT HR: 125 %
STRESS POST ESTIMATED WORKLOAD: 7.1 METS
STRESS POST EXERCISE DUR MIN: 7 MIN
STRESS POST EXERCISE DUR SEC: 30 SEC
STRESS POST O2 SAT PEAK: 98 %
STRESS POST PEAK BP: NORMAL MMHG
STRESS POST PEAK HR: 162 BPM
STRESS TARGET HR: 130 BPM

## 2025-05-15 NOTE — PROGRESS NOTES
Echocardiogram : Heart function is normal, there are no major valvular problems    Stress test : Blood supply appears to be normal to all the walls of the heart, indicating no major blockages in the heart arteries.    At this time, no additional cardiac testing needed.  A Cardiac catheterization is not recommended since stress test turned out to be fine.  We recommend to continue atorvastatin and other medications.    6-month follow-up in the clinic.  Will need CMP/lipid panel before the visit.  May be scheduled with DOLLY Perez.      Electronically signed by Ron Mustafa MD, 05/15/25, 5:16 PM EDT.

## 2025-05-16 NOTE — TELEPHONE ENCOUNTER
ABDULKADIR patient. Went over results and recommendations. Patient verbalized understanding and appreciation. Patient scheduled f/u.     Lab orders placed. Patient is aware labs will be fasting.

## 2025-06-02 ENCOUNTER — TELEPHONE (OUTPATIENT)
Dept: FAMILY MEDICINE CLINIC | Facility: CLINIC | Age: 65
End: 2025-06-02
Payer: COMMERCIAL

## 2025-06-02 NOTE — TELEPHONE ENCOUNTER
Pt called this morning needing to know the status of his FMLA.  Pt had submitted last week and it is due. Nothing has been scanned.     Please advise- [phone]

## 2025-06-02 NOTE — TELEPHONE ENCOUNTER
Spoke with gayatri sharma about fmla paper work said they will be faxing over forms to sign told him we will be looking for them

## 2025-06-02 NOTE — TELEPHONE ENCOUNTER
Spoke with pt let him know we never got anything for fmla pt said he would fax it back over to us

## 2025-06-04 ENCOUNTER — TELEPHONE (OUTPATIENT)
Dept: FAMILY MEDICINE CLINIC | Facility: CLINIC | Age: 65
End: 2025-06-04

## 2025-06-04 NOTE — TELEPHONE ENCOUNTER
SPOKE WITH PT. HE IS REQUESTING A PHONE CALL WHEN FAX FOR HIS [employer]ERWORK COMES IN AND IS FILLED OUT

## 2025-07-22 ENCOUNTER — LAB (OUTPATIENT)
Dept: LAB | Facility: HOSPITAL | Age: 65
End: 2025-07-22
Payer: COMMERCIAL

## 2025-07-22 DIAGNOSIS — E78.2 MIXED HYPERLIPIDEMIA: ICD-10-CM

## 2025-07-22 LAB
CHOLEST SERPL-MCNC: 151 MG/DL (ref 0–200)
HDLC SERPL-MCNC: 30 MG/DL (ref 40–60)
LDLC SERPL CALC-MCNC: 90 MG/DL (ref 0–100)
LDLC/HDLC SERPL: 2.84 {RATIO}
TRIGL SERPL-MCNC: 179 MG/DL (ref 0–150)
VLDLC SERPL-MCNC: 31 MG/DL (ref 5–40)

## 2025-07-22 PROCEDURE — 36415 COLL VENOUS BLD VENIPUNCTURE: CPT

## 2025-07-22 PROCEDURE — 80061 LIPID PANEL: CPT
